# Patient Record
Sex: FEMALE | Race: WHITE | URBAN - METROPOLITAN AREA
[De-identification: names, ages, dates, MRNs, and addresses within clinical notes are randomized per-mention and may not be internally consistent; named-entity substitution may affect disease eponyms.]

---

## 2018-11-16 ENCOUNTER — EMERGENCY (EMERGENCY)
Facility: HOSPITAL | Age: 68
LOS: 0 days | Discharge: HOME | End: 2018-11-16
Attending: EMERGENCY MEDICINE | Admitting: EMERGENCY MEDICINE
Payer: COMMERCIAL

## 2018-11-16 VITALS
SYSTOLIC BLOOD PRESSURE: 197 MMHG | RESPIRATION RATE: 17 BRPM | DIASTOLIC BLOOD PRESSURE: 72 MMHG | TEMPERATURE: 98 F | OXYGEN SATURATION: 99 % | HEART RATE: 80 BPM

## 2018-11-16 VITALS — HEIGHT: 65 IN | WEIGHT: 123.02 LBS
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
## 2018-11-16 DIAGNOSIS — S52.501A UNSPECIFIED FRACTURE OF THE LOWER END OF RIGHT RADIUS, INITIAL ENCOUNTER FOR CLOSED FRACTURE: ICD-10-CM
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
## 2018-11-16 DIAGNOSIS — M79.602 PAIN IN LEFT ARM: ICD-10-CM

## 2018-11-16 PROCEDURE — 73090 X-RAY EXAM OF FOREARM: CPT | Mod: 26,LT

## 2018-11-16 PROCEDURE — 99284 EMERGENCY DEPT VISIT MOD MDM: CPT | Mod: 25

## 2018-11-16 PROCEDURE — 25605 CLTX DST RDL FX/EPHYS SEP W/: CPT | Mod: 54,LT

## 2018-11-16 PROCEDURE — 73110 X-RAY EXAM OF WRIST: CPT | Mod: 26,LT,76

## 2018-11-16 RX ORDER — ACETAMINOPHEN 500 MG
1000 TABLET ORAL ONCE
Qty: 0 | Refills: 0 | Status: COMPLETED | OUTPATIENT
Start: 2018-11-16 | End: 2018-11-16

## 2018-11-16 RX ADMIN — Medication 1000 MILLIGRAM(S): at 07:58

## 2018-11-16 NOTE — ED ADULT NURSE REASSESSMENT NOTE - NS ED NURSE REASSESS COMMENT FT1
Dr Hernandez made aware that pt bp elevated 197/72. MD stated ok to be d/c home with that and will f/u with PMD.

## 2018-11-16 NOTE — PROCEDURE NOTE - NSPERIPVASCEVA_GEN_A_CORE
capillary refill time < 2 seconds/post-application: responses intact/fingers/toes warm to touch/no paresthesia

## 2018-11-16 NOTE — ED PROVIDER NOTE - OBJECTIVE STATEMENT
67 yo pt presents with left arm pain.  Pt here with brother in law.  Pt states yesterday tried to sit in reclining chair.  Missed and fell on left arm.  pt ir right handed. 69 yo pt presents with left arm pain.  Pt here with brother in law.  Pt states yesterday tried to sit in reclining chair.  Missed and fell on left arm.  pt is right handed.  Pt with pain to left wrist and forearm, + bruising.  No travel, no sick contact.  Denies LOC.  No chest pain, no sob.  pt with hx of back issues and on gabapentin for neuropathy.

## 2018-11-16 NOTE — ED ADULT NURSE NOTE - OBJECTIVE STATEMENT
pt was standing on chair, it moved and landed on Left wrist/arm. Pt c/o of pain in left FA, neg loc. Left FA is swollen and ecchymotic, positive radial pulse.

## 2018-11-16 NOTE — ED PROVIDER NOTE - MUSCULOSKELETAL, MLM
ecchymosis distal 1/3 left arm ulnar side.  Mild pain to palp distal forearm/wrist.  Skin intact.  No pain palp at elbow, no pain palp hand.

## 2018-11-16 NOTE — PROCEDURE NOTE - ADDITIONAL PROCEDURE DETAILS
local anesthesia hematoma block under sterile prep with lidocaine in 10cc syringe and 22gu needle follwed by haging arm with IV pole and weighted counter traction, closed reduction performed by Dr. Ayala and application of ST spling well padded, molded, PA assist in procedure

## 2018-11-16 NOTE — ED ADULT NURSE NOTE - NSIMPLEMENTINTERV_GEN_ALL_ED
Implemented All Fall Risk Interventions:  [place] to call system. Call bell, personal items and telephone within reach. Instruct patient to call for assistance. Room bathroom lighting operational. Non-slip footwear when patient is off stretcher. Physically safe environment: no spills, clutter or unnecessary equipment. Stretcher in lowest position, wheels locked, appropriate side rails in place. Provide visual cue, wrist band, yellow gown, etc. Monitor gait and stability. Monitor for mental status changes and reorient to person, place, and time. Review medications for side effects contributing to fall risk. Reinforce activity limits and safety measures with patient and family.

## 2018-11-16 NOTE — ED PROVIDER NOTE - HEME LYMPH, MLM
Physical Exam  Mallampati: II  TM Distance: >3 FB  Neck ROM: Full  Cardio Rhythm: Regular  Cardio Rate: Normal  Breath sounds clear to auscultation:  Yes      Legend: C=Chipped  M=Missing  L=Loose    Anesthesia Plan  ASA Status: 2  Anesthesia Type: General  Induction: Intravenous  Preferred Airway Type: ETT  Reviewed: Problem List, Allergies, Patient Summary, Past Med History, Medications, NPO Status, Beta Blocker Status, Pre-Induction Reassessment, Lab Results, EKG, Consultations and Nursing Notes  The proposed anesthetic plan, including its risks and benefits, have been discussed with the Patient - along with the risks and benefits of alternatives.  Questions were encouraged and answered and the patient and/or representative agrees to proceed.  Blood Products: Not Anticipated  Plan Comments: R/B of general endotracheal anesthesia d/w patient including but not limited to cardiac complications, respiratory complications, CNS complications, N/V, sore throat, dental injury, and transfusion. Questions answered and patient wishes to proceed.      Anesthesia ROS/Med Hx    Overall Review:  Pts. EKG was reviewed     Pulmonary Review:    Pt. negative for sleep apnea   Pt. negative for asthma  Pt. negative for recent URI     Neuro/Psych Review:    Pt. negative for seizures  Pt. negative for CVA  Pt. positive for headaches    Cardiovascular Review:    Exercise tolerance: good  Pt. negative for CHF  Pt. negative for past MI  Pt. negative for CAD  Pt. negative for CABG/stent  Pt. negative for angina  Pt. negative for pacemaker  Pt. negative for LAU  Pt. positive for hypertension  Pt. positive for hyperlipidemia    GI/HEPATIC/RENAL Review:    Pt. negative for liver disease  Pt. negative for renal disease    End/Other Review:    Pt. negative for diabetes  Pt. positive for anemia  Pt. positive for obesity     
No adenopathy or splenomegaly. No cervical or inguinal lymphadenopathy.

## 2018-11-25 ENCOUNTER — INPATIENT (INPATIENT)
Facility: HOSPITAL | Age: 68
LOS: 0 days | Discharge: HOME | End: 2018-11-26
Attending: ORTHOPAEDIC SURGERY | Admitting: ORTHOPAEDIC SURGERY
Payer: COMMERCIAL

## 2018-11-25 ENCOUNTER — TRANSCRIPTION ENCOUNTER (OUTPATIENT)
Age: 68
End: 2018-11-25

## 2018-11-25 VITALS — HEIGHT: 63 IN | WEIGHT: 119.93 LBS

## 2018-11-25 PROBLEM — G57.92 UNSPECIFIED MONONEUROPATHY OF LEFT LOWER LIMB: Chronic | Status: ACTIVE | Noted: 2018-11-16

## 2018-11-25 LAB
ANION GAP SERPL CALC-SCNC: 8 MMOL/L — SIGNIFICANT CHANGE UP (ref 5–17)
APTT BLD: 31.8 SEC — SIGNIFICANT CHANGE UP (ref 27.5–36.3)
BUN SERPL-MCNC: 20 MG/DL — SIGNIFICANT CHANGE UP (ref 7–23)
CALCIUM SERPL-MCNC: 9.3 MG/DL — SIGNIFICANT CHANGE UP (ref 8.5–10.1)
CHLORIDE SERPL-SCNC: 107 MMOL/L — SIGNIFICANT CHANGE UP (ref 96–108)
CO2 SERPL-SCNC: 26 MMOL/L — SIGNIFICANT CHANGE UP (ref 22–31)
CREAT SERPL-MCNC: 0.87 MG/DL — SIGNIFICANT CHANGE UP (ref 0.5–1.3)
GLUCOSE SERPL-MCNC: 102 MG/DL — HIGH (ref 70–99)
HCT VFR BLD CALC: 38.3 % — SIGNIFICANT CHANGE UP (ref 34.5–45)
HGB BLD-MCNC: 12.6 G/DL — SIGNIFICANT CHANGE UP (ref 11.5–15.5)
INR BLD: 0.97 RATIO — SIGNIFICANT CHANGE UP (ref 0.88–1.16)
MCHC RBC-ENTMCNC: 29.7 PG — SIGNIFICANT CHANGE UP (ref 27–34)
MCHC RBC-ENTMCNC: 32.9 GM/DL — SIGNIFICANT CHANGE UP (ref 32–36)
MCV RBC AUTO: 90.3 FL — SIGNIFICANT CHANGE UP (ref 80–100)
NRBC # BLD: 0 /100 WBCS — SIGNIFICANT CHANGE UP (ref 0–0)
PLATELET # BLD AUTO: 351 K/UL — SIGNIFICANT CHANGE UP (ref 150–400)
POTASSIUM SERPL-MCNC: 4.4 MMOL/L — SIGNIFICANT CHANGE UP (ref 3.5–5.3)
POTASSIUM SERPL-SCNC: 4.4 MMOL/L — SIGNIFICANT CHANGE UP (ref 3.5–5.3)
PROTHROM AB SERPL-ACNC: 10.7 SEC — SIGNIFICANT CHANGE UP (ref 10–12.9)
RBC # BLD: 4.24 M/UL — SIGNIFICANT CHANGE UP (ref 3.8–5.2)
RBC # FLD: 12.7 % — SIGNIFICANT CHANGE UP (ref 10.3–14.5)
SODIUM SERPL-SCNC: 141 MMOL/L — SIGNIFICANT CHANGE UP (ref 135–145)
WBC # BLD: 3.86 K/UL — SIGNIFICANT CHANGE UP (ref 3.8–10.5)
WBC # FLD AUTO: 3.86 K/UL — SIGNIFICANT CHANGE UP (ref 3.8–10.5)

## 2018-11-25 PROCEDURE — 99283 EMERGENCY DEPT VISIT LOW MDM: CPT

## 2018-11-25 RX ORDER — OXYCODONE HYDROCHLORIDE 5 MG/1
5 TABLET ORAL ONCE
Qty: 0 | Refills: 0 | Status: DISCONTINUED | OUTPATIENT
Start: 2018-11-25 | End: 2018-11-25

## 2018-11-25 RX ORDER — ACETAMINOPHEN 500 MG
650 TABLET ORAL EVERY 6 HOURS
Qty: 0 | Refills: 0 | Status: DISCONTINUED | OUTPATIENT
Start: 2018-11-25 | End: 2018-11-26

## 2018-11-25 RX ORDER — CEFAZOLIN SODIUM 1 G
2000 VIAL (EA) INJECTION ONCE
Qty: 0 | Refills: 0 | Status: COMPLETED | OUTPATIENT
Start: 2018-11-25 | End: 2018-11-25

## 2018-11-25 RX ORDER — ACETAMINOPHEN 500 MG
1000 TABLET ORAL ONCE
Qty: 0 | Refills: 0 | Status: DISCONTINUED | OUTPATIENT
Start: 2018-11-25 | End: 2018-11-25

## 2018-11-25 RX ORDER — GABAPENTIN 400 MG/1
100 CAPSULE ORAL DAILY
Qty: 0 | Refills: 0 | Status: DISCONTINUED | OUTPATIENT
Start: 2018-11-25 | End: 2018-11-26

## 2018-11-25 RX ORDER — FENTANYL CITRATE 50 UG/ML
50 INJECTION INTRAVENOUS
Qty: 0 | Refills: 0 | Status: DISCONTINUED | OUTPATIENT
Start: 2018-11-25 | End: 2018-11-25

## 2018-11-25 RX ORDER — OXYCODONE HYDROCHLORIDE 5 MG/1
5 TABLET ORAL EVERY 4 HOURS
Qty: 0 | Refills: 0 | Status: DISCONTINUED | OUTPATIENT
Start: 2018-11-25 | End: 2018-11-26

## 2018-11-25 RX ORDER — SODIUM CHLORIDE 9 MG/ML
1000 INJECTION, SOLUTION INTRAVENOUS
Qty: 0 | Refills: 0 | Status: DISCONTINUED | OUTPATIENT
Start: 2018-11-25 | End: 2018-11-25

## 2018-11-25 RX ORDER — ONDANSETRON 8 MG/1
4 TABLET, FILM COATED ORAL ONCE
Qty: 0 | Refills: 0 | Status: DISCONTINUED | OUTPATIENT
Start: 2018-11-25 | End: 2018-11-25

## 2018-11-25 RX ADMIN — GABAPENTIN 100 MILLIGRAM(S): 400 CAPSULE ORAL at 21:37

## 2018-11-25 RX ADMIN — Medication 100 MILLIGRAM(S): at 14:24

## 2018-11-25 RX ADMIN — OXYCODONE HYDROCHLORIDE 5 MILLIGRAM(S): 5 TABLET ORAL at 21:37

## 2018-11-25 RX ADMIN — OXYCODONE HYDROCHLORIDE 5 MILLIGRAM(S): 5 TABLET ORAL at 22:07

## 2018-11-25 NOTE — DISCHARGE NOTE ADULT - CARE PLAN
Principal Discharge DX:	Fracture of radius, distal, left, closed  Goal:	resume adls  Assessment and plan of treatment:	Pain control, patient already has prescription for percocet for severe pain  Keep splint clean dry and intact until seen in office  Keep LUE elevated for swelling  Block should work for 24 hours, expect pain to get worse after that  Non weight bearing LUE in splint  Follow up in 1 week from discharge

## 2018-11-25 NOTE — ED ADULT NURSE NOTE - NSIMPLEMENTINTERV_GEN_ALL_ED
Implemented All Fall Risk Interventions:  [place] to call system. Call bell, personal items and telephone within reach. Instruct patient to call for assistance. Room bathroom lighting operational. Non-slip footwear when patient is off stretcher. Physically safe environment: no spills, clutter or unnecessary equipment. Stretcher in lowest position, wheels locked, appropriate side rails in place. Provide visual cue, wrist band, yellow gown, etc. Monitor gait and stability. Monitor for mental status changes and reorient to person, place, and time. Review medications for side effects contributing to fall risk. Reinforce activity limits and safety measures with patient and family.

## 2018-11-25 NOTE — H&P ADULT - ASSESSMENT
A/P: 68F w/ L Distal Radius Fx  Plan for OR today for ORIF  NPO  IVF while NPO  Hold all chemical DVT ppx  NWB [facility]  Discussed with attending Dr. Ayala who is in agreement with above plan

## 2018-11-25 NOTE — DISCHARGE NOTE ADULT - PLAN OF CARE
resume adls Pain control, patient already has prescription for percocet for severe pain  Keep splint clean dry and intact until seen in office  Keep LUE elevated for swelling  Block should work for 24 hours, expect pain to get worse after that  Non weight bearing LUE in splint  Follow up in 1 week from discharge

## 2018-11-25 NOTE — ED ADULT NURSE NOTE - OBJECTIVE STATEMENT
Pt has known fx from slip and fall. Pt told to come to ED for possible procedure. Pt verbalizes awareness of POC.  Fingers warm and mobile.

## 2018-11-25 NOTE — BRIEF OPERATIVE NOTE - PROCEDURE
<<-----Click on this checkbox to enter Procedure Open reduction and internal fixation (ORIF) of fracture of distal radius  11/25/2018    Active  NFRANE

## 2018-11-25 NOTE — PROGRESS NOTE ADULT - SUBJECTIVE AND OBJECTIVE BOX
Left Infraclavicular Nerve Block Note:  Time out performed, pt awake and alert, sterile prep with chlorhexidine and drape, ultrasound-guided, 20G 6" stimuplex needle, good visualization of needle and nerve at all times, 30cc of 0.375% Ropivacaine injected easily, no heme after aspirating every 5cc, no intraneural injection, no paresthesia.  Procedure well tolerated without complications.  Block performed as per surgeon's request.

## 2018-11-25 NOTE — H&P ADULT - HISTORY OF PRESENT ILLNESS
Patient is a 68F RHD who presents to the ED today for a c/o of L wrist pain. Patient sustained a fracture to her L distal radius last week and now presents to [facility] for operative fixation. Denies any numbness/tingling. Denies having any other pain elsewhere. No other orthopedic concerns at this time.

## 2018-11-25 NOTE — H&P ADULT - NSHPPHYSICALEXAM_GEN_ALL_CORE
Vital Signs Last 24 Hrs  T(C): 36.6 (25 Nov 2018 06:54), Max: 36.6 (25 Nov 2018 06:54)  T(F): 97.9 (25 Nov 2018 06:54), Max: 97.9 (25 Nov 2018 06:54)  HR: 77 (25 Nov 2018 06:54) (77 - 77)  BP: 140/87 (25 Nov 2018 06:54) (140/87 - 140/87)  RR: 16 (25 Nov 2018 06:54) (16 - 16)  SpO2: 100% (25 Nov 2018 06:54) (100% - 100%)    Gen: NAD; Resting comfortably  LUE:  Sugar tong splint in place  +AIN/PIN/Median/Radial/Ulnar  SILT C5-T1  Capillary refill <2 secs  Compartments soft and compressible    Secondary Survey: No TTP over bony prominences, SILT, palpable pulses, full/painless range of motion, compartments soft

## 2018-11-25 NOTE — DISCHARGE NOTE ADULT - HOSPITAL COURSE
patient was admitted to [facility] 11/15/2018 for operative fixation of distal radius left fracture. Patient was seen in office and xrays revealed displacement of closed reduction. Patient was medically optimized for procedure and there were no complications. Patient transfered from PACU in stable condition and stable for discharge same day of surgery.

## 2018-11-25 NOTE — DISCHARGE NOTE ADULT - PATIENT PORTAL LINK FT
You can access the [facility] Patient Portal, offered by [facility], by registering with the following website: [email or web address]

## 2018-11-25 NOTE — DISCHARGE NOTE ADULT - CARE PROVIDER_API CALL
Ashok Ayala), Orthopaedic Surgery; Surgery of the Hand  [address] [place] [address]  Phone: [phone]  Fax: [phone]

## 2018-11-25 NOTE — ED PROVIDER NOTE - OBJECTIVE STATEMENT
pt with left distal radius fx last week sent to ed by ortho for admission for surgery no complaints pain controlled

## 2018-11-25 NOTE — DISCHARGE NOTE ADULT - MEDICATION SUMMARY - MEDICATIONS TO TAKE
I will START or STAY ON the medications listed below when I get home from the hospital:    Percocet 5/325 oral tablet  -- 1-2 tab(s) by mouth every 6 hours, As Needed for pain MDD:8  -- Caution federal law prohibits the transfer of this drug to any person other  than the person for whom it was prescribed.  May cause drowsiness.  Alcohol may intensify this effect.  Use care when operating dangerous machinery.  This prescription cannot be refilled.  This product contains acetaminophen.  Do not use  with any other product containing acetaminophen to prevent possible liver damage.  Using more of this medication than prescribed may cause serious breathing problems.    -- Indication: For prn severe pain, tylenol for mild pain I will START or STAY ON the medications listed below when I get home from the hospital:    Percocet 5/325 oral tablet  -- 1-2 tab(s) by mouth every 6 hours, As Needed for pain MDD:6  -- Caution federal law prohibits the transfer of this drug to any person other  than the person for whom it was prescribed.  May cause drowsiness.  Alcohol may intensify this effect.  Use care when operating dangerous machinery.  This prescription cannot be refilled.  This product contains acetaminophen.  Do not use  with any other product containing acetaminophen to prevent possible liver damage.  Using more of this medication than prescribed may cause serious breathing problems.    -- Indication: For For severe pain

## 2018-11-26 VITALS
TEMPERATURE: 99 F | OXYGEN SATURATION: 99 % | HEART RATE: 67 BPM | SYSTOLIC BLOOD PRESSURE: 123 MMHG | DIASTOLIC BLOOD PRESSURE: 54 MMHG | RESPIRATION RATE: 16 BRPM

## 2018-11-26 RX ORDER — ACETAMINOPHEN 500 MG
1000 TABLET ORAL ONCE
Qty: 0 | Refills: 0 | Status: COMPLETED | OUTPATIENT
Start: 2018-11-26 | End: 2018-11-26

## 2018-11-26 RX ADMIN — Medication 400 MILLIGRAM(S): at 03:46

## 2018-11-26 RX ADMIN — OXYCODONE HYDROCHLORIDE 5 MILLIGRAM(S): 5 TABLET ORAL at 01:50

## 2018-11-26 RX ADMIN — Medication 1000 MILLIGRAM(S): at 04:01

## 2018-11-26 RX ADMIN — OXYCODONE HYDROCHLORIDE 5 MILLIGRAM(S): 5 TABLET ORAL at 01:20

## 2018-11-26 RX ADMIN — OXYCODONE HYDROCHLORIDE 5 MILLIGRAM(S): 5 TABLET ORAL at 05:50

## 2018-11-26 RX ADMIN — OXYCODONE HYDROCHLORIDE 5 MILLIGRAM(S): 5 TABLET ORAL at 05:20

## 2018-11-26 NOTE — PROGRESS NOTE ADULT - ASSESSMENT
patient to be discharged this am  See discharge note for discharge recommendations  orthopedic stable for discharge

## 2018-11-26 NOTE — PROGRESS NOTE ADULT - SUBJECTIVE AND OBJECTIVE BOX
Pt S/E at bedside, no acute events overnight, pain controlled    AVSS  Gen: NAD, AAOx3    LUE:  Dressing clean dry intact  +AIN/PIN/M/R/U/Msc/Ax  SILT C5-T1  +Radial Pulse  Compartments soft  No calf TTP B/L

## 2018-11-28 DIAGNOSIS — Z88.8 ALLERGY STATUS TO OTHER DRUGS, MEDICAMENTS AND BIOLOGICAL SUBSTANCES: ICD-10-CM

## 2018-11-28 DIAGNOSIS — S52.572A OTHER INTRAARTICULAR FRACTURE OF LOWER END OF LEFT RADIUS, INITIAL ENCOUNTER FOR CLOSED FRACTURE: ICD-10-CM
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
## 2018-11-28 DIAGNOSIS — G62.9 POLYNEUROPATHY, UNSPECIFIED: ICD-10-CM
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
## 2019-04-19 ENCOUNTER — INPATIENT (INPATIENT)
Facility: HOSPITAL | Age: 69
LOS: 0 days | Discharge: HOME | End: 2019-04-20
Attending: INTERNAL MEDICINE | Admitting: INTERNAL MEDICINE
Payer: COMMERCIAL

## 2019-04-19 VITALS
TEMPERATURE: 98 F | OXYGEN SATURATION: 100 % | HEART RATE: 90 BPM | SYSTOLIC BLOOD PRESSURE: 155 MMHG | DIASTOLIC BLOOD PRESSURE: 89 MMHG | RESPIRATION RATE: 18 BRPM

## 2019-04-19 DIAGNOSIS — Z98.890 OTHER SPECIFIED POSTPROCEDURAL STATES: Chronic | ICD-10-CM

## 2019-04-19 DIAGNOSIS — G45.4 TRANSIENT GLOBAL AMNESIA: ICD-10-CM

## 2019-04-19 DIAGNOSIS — M48.00 SPINAL STENOSIS, SITE UNSPECIFIED: ICD-10-CM

## 2019-04-19 DIAGNOSIS — G62.9 POLYNEUROPATHY, UNSPECIFIED: ICD-10-CM

## 2019-04-19 DIAGNOSIS — K21.9 GASTRO-ESOPHAGEAL REFLUX DISEASE WITHOUT ESOPHAGITIS: ICD-10-CM

## 2019-04-19 DIAGNOSIS — Z87.81 PERSONAL HISTORY OF (HEALED) TRAUMATIC FRACTURE: Chronic | ICD-10-CM

## 2019-04-19 DIAGNOSIS — G45.9 TRANSIENT CEREBRAL ISCHEMIC ATTACK, UNSPECIFIED: ICD-10-CM

## 2019-04-19 LAB
ALBUMIN SERPL ELPH-MCNC: 4.4 G/DL — SIGNIFICANT CHANGE UP (ref 3.3–5)
ALP SERPL-CCNC: 88 U/L — SIGNIFICANT CHANGE UP (ref 40–120)
ALT FLD-CCNC: 36 U/L — SIGNIFICANT CHANGE UP (ref 12–78)
ANION GAP SERPL CALC-SCNC: 12 MMOL/L — SIGNIFICANT CHANGE UP (ref 5–17)
APPEARANCE UR: CLEAR — SIGNIFICANT CHANGE UP
APTT BLD: 30.3 SEC — SIGNIFICANT CHANGE UP (ref 27.5–36.3)
AST SERPL-CCNC: 25 U/L — SIGNIFICANT CHANGE UP (ref 15–37)
BACTERIA # UR AUTO: NEGATIVE — SIGNIFICANT CHANGE UP
BASOPHILS # BLD AUTO: 0.05 K/UL — SIGNIFICANT CHANGE UP (ref 0–0.2)
BASOPHILS NFR BLD AUTO: 0.8 % — SIGNIFICANT CHANGE UP (ref 0–2)
BILIRUB SERPL-MCNC: 0.5 MG/DL — SIGNIFICANT CHANGE UP (ref 0.2–1.2)
BILIRUB UR-MCNC: NEGATIVE — SIGNIFICANT CHANGE UP
BUN SERPL-MCNC: 23 MG/DL — SIGNIFICANT CHANGE UP (ref 7–23)
CALCIUM SERPL-MCNC: 9.9 MG/DL — SIGNIFICANT CHANGE UP (ref 8.5–10.1)
CHLORIDE SERPL-SCNC: 104 MMOL/L — SIGNIFICANT CHANGE UP (ref 96–108)
CO2 SERPL-SCNC: 25 MMOL/L — SIGNIFICANT CHANGE UP (ref 22–31)
COLOR SPEC: YELLOW — SIGNIFICANT CHANGE UP
CREAT SERPL-MCNC: 0.93 MG/DL — SIGNIFICANT CHANGE UP (ref 0.5–1.3)
DIFF PNL FLD: NEGATIVE — SIGNIFICANT CHANGE UP
EOSINOPHIL # BLD AUTO: 0.05 K/UL — SIGNIFICANT CHANGE UP (ref 0–0.5)
EOSINOPHIL NFR BLD AUTO: 0.8 % — SIGNIFICANT CHANGE UP (ref 0–6)
EPI CELLS # UR: NEGATIVE — SIGNIFICANT CHANGE UP
GLUCOSE SERPL-MCNC: 113 MG/DL — HIGH (ref 70–99)
GLUCOSE UR QL: NEGATIVE MG/DL — SIGNIFICANT CHANGE UP
HCT VFR BLD CALC: 39 % — SIGNIFICANT CHANGE UP (ref 34.5–45)
HGB BLD-MCNC: 13.4 G/DL — SIGNIFICANT CHANGE UP (ref 11.5–15.5)
IMM GRANULOCYTES NFR BLD AUTO: 0.2 % — SIGNIFICANT CHANGE UP (ref 0–1.5)
INR BLD: 1.03 RATIO — SIGNIFICANT CHANGE UP (ref 0.88–1.16)
KETONES UR-MCNC: ABNORMAL
LEUKOCYTE ESTERASE UR-ACNC: ABNORMAL
LYMPHOCYTES # BLD AUTO: 1.49 K/UL — SIGNIFICANT CHANGE UP (ref 1–3.3)
LYMPHOCYTES # BLD AUTO: 22.9 % — SIGNIFICANT CHANGE UP (ref 13–44)
MCHC RBC-ENTMCNC: 30.9 PG — SIGNIFICANT CHANGE UP (ref 27–34)
MCHC RBC-ENTMCNC: 34.4 GM/DL — SIGNIFICANT CHANGE UP (ref 32–36)
MCV RBC AUTO: 90.1 FL — SIGNIFICANT CHANGE UP (ref 80–100)
MONOCYTES # BLD AUTO: 0.57 K/UL — SIGNIFICANT CHANGE UP (ref 0–0.9)
MONOCYTES NFR BLD AUTO: 8.8 % — SIGNIFICANT CHANGE UP (ref 2–14)
NEUTROPHILS # BLD AUTO: 4.34 K/UL — SIGNIFICANT CHANGE UP (ref 1.8–7.4)
NEUTROPHILS NFR BLD AUTO: 66.5 % — SIGNIFICANT CHANGE UP (ref 43–77)
NITRITE UR-MCNC: NEGATIVE — SIGNIFICANT CHANGE UP
NRBC # BLD: 0 /100 WBCS — SIGNIFICANT CHANGE UP (ref 0–0)
PH UR: 7 — SIGNIFICANT CHANGE UP (ref 5–8)
PLATELET # BLD AUTO: 279 K/UL — SIGNIFICANT CHANGE UP (ref 150–400)
POTASSIUM SERPL-MCNC: 3.8 MMOL/L — SIGNIFICANT CHANGE UP (ref 3.5–5.3)
POTASSIUM SERPL-SCNC: 3.8 MMOL/L — SIGNIFICANT CHANGE UP (ref 3.5–5.3)
PROT SERPL-MCNC: 8.4 GM/DL — HIGH (ref 6–8.3)
PROT UR-MCNC: NEGATIVE MG/DL — SIGNIFICANT CHANGE UP
PROTHROM AB SERPL-ACNC: 11.5 SEC — SIGNIFICANT CHANGE UP (ref 10–12.9)
RBC # BLD: 4.33 M/UL — SIGNIFICANT CHANGE UP (ref 3.8–5.2)
RBC # FLD: 12.8 % — SIGNIFICANT CHANGE UP (ref 10.3–14.5)
RBC CASTS # UR COMP ASSIST: NEGATIVE /HPF — SIGNIFICANT CHANGE UP (ref 0–4)
SODIUM SERPL-SCNC: 141 MMOL/L — SIGNIFICANT CHANGE UP (ref 135–145)
SP GR SPEC: 1 — LOW (ref 1.01–1.02)
UROBILINOGEN FLD QL: NEGATIVE MG/DL — SIGNIFICANT CHANGE UP
WBC # BLD: 6.51 K/UL — SIGNIFICANT CHANGE UP (ref 3.8–10.5)
WBC # FLD AUTO: 6.51 K/UL — SIGNIFICANT CHANGE UP (ref 3.8–10.5)
WBC UR QL: SIGNIFICANT CHANGE UP

## 2019-04-19 PROCEDURE — 93010 ELECTROCARDIOGRAM REPORT: CPT

## 2019-04-19 PROCEDURE — 70496 CT ANGIOGRAPHY HEAD: CPT | Mod: 26

## 2019-04-19 PROCEDURE — 70498 CT ANGIOGRAPHY NECK: CPT | Mod: 26

## 2019-04-19 PROCEDURE — 99285 EMERGENCY DEPT VISIT HI MDM: CPT

## 2019-04-19 PROCEDURE — 70551 MRI BRAIN STEM W/O DYE: CPT | Mod: 26

## 2019-04-19 PROCEDURE — 71045 X-RAY EXAM CHEST 1 VIEW: CPT | Mod: 26

## 2019-04-19 RX ORDER — ASPIRIN/CALCIUM CARB/MAGNESIUM 324 MG
325 TABLET ORAL ONCE
Qty: 0 | Refills: 0 | Status: COMPLETED | OUTPATIENT
Start: 2019-04-19 | End: 2019-04-19

## 2019-04-19 RX ORDER — ASPIRIN/CALCIUM CARB/MAGNESIUM 324 MG
81 TABLET ORAL DAILY
Qty: 0 | Refills: 0 | Status: DISCONTINUED | OUTPATIENT
Start: 2019-04-19 | End: 2019-04-20

## 2019-04-19 RX ORDER — ATORVASTATIN CALCIUM 80 MG/1
10 TABLET, FILM COATED ORAL AT BEDTIME
Qty: 0 | Refills: 0 | Status: DISCONTINUED | OUTPATIENT
Start: 2019-04-19 | End: 2019-04-20

## 2019-04-19 RX ORDER — ENOXAPARIN SODIUM 100 MG/ML
40 INJECTION SUBCUTANEOUS EVERY 24 HOURS
Qty: 0 | Refills: 0 | Status: DISCONTINUED | OUTPATIENT
Start: 2019-04-19 | End: 2019-04-20

## 2019-04-19 RX ORDER — PANTOPRAZOLE SODIUM 20 MG/1
40 TABLET, DELAYED RELEASE ORAL
Qty: 0 | Refills: 0 | Status: DISCONTINUED | OUTPATIENT
Start: 2019-04-19 | End: 2019-04-20

## 2019-04-19 RX ADMIN — Medication 325 MILLIGRAM(S): at 15:09

## 2019-04-19 RX ADMIN — ENOXAPARIN SODIUM 40 MILLIGRAM(S): 100 INJECTION SUBCUTANEOUS at 21:47

## 2019-04-19 NOTE — ED ADULT TRIAGE NOTE - CHIEF COMPLAINT QUOTE
Pt presents to ED with onset of symptoms starting about 9:30 am, pt believes it is year 2010, asking repetitive questions.  No facial droop or slurred speech.  Unable to obtain much information, pt upset in triage.  Dr. Leach called for dong bunch and CODE STROKE called based on presenting symptoms.

## 2019-04-19 NOTE — H&P ADULT - ASSESSMENT
69 y/o female with PMHx of spinal stenosis with neuropathy and GERD who presented to  with period of confusion/ amnesia.  History obtained from chart, patient, and son at bedside.  Pt was in her usual state of health up until about 11am this morning.  She was fine yesterday and went to work in [employer].  This morning, her daughter called her saying she was fired from her job.  After this, she was very upset and started talking to other family members.  She then spoke to her daughter again and did not recall all the events of their previous conversation and was not making sense when she was speaking as per the son/ daughter.  She presented to the ER for concern of CVA.  In ER, she was a code stroke.  CT was negative-- no acute CVA.  Pt was seen by Neurology and thought to have Transient Global Amnesia.  MRI was ordered.      1.  Confusion/ Amnesia:    Concern for transient global amnesia; however, sx still ongoing for 6+ hours.    CT head on admission negative.    Check MRI brain.    T/c EEG if sx persist.    Neuro eval appreciated.    Check CXR/ UA to r/o other causes-- infection.    Check TSH/ B12 to r/o metabolic causes.    Unclear if patient was taking neurontin regularly at home.    Neuro checks.    ECHO.    ASA/ statin until MRI rulled out small CVA.    TELE admit.      2.  GERD:    cont protonix.      3.  Spinal stenosis/ Neuropathy:    Hold neurontin with confusion.      4.  DVT Proph:  Lovenox.      Advanced Directives:  Full Code.      DVT proph -  IMPROVE VTE Individual Risk Assessment    RISK                                                                Points    [  ] Previous VTE                                                  3    [  ] Thrombophilia                                               2    [  ] Lower limb paralysis                                      2        (unable to hold up >15 seconds)      [  ] Current Cancer                                              2         (within 6 months)    [  ] Immobilization > 24 hrs                                1    [  ] ICU/CCU stay > 24 hours                              1    [  ] Age > 60                                                      1    IMPROVE VTE Score ______1___      Total time spent 75 min.

## 2019-04-19 NOTE — ED ADULT NURSE NOTE - OBJECTIVE STATEMENT
pt brought to ED for evaluation of change in mental status. Pt called friend this morning, estimated between 9 and 10am as per friend Eva. She sts pt was speaking normally saying her daughter was fired. Pt than called again between 10 and 11 am and was acting "differently" she was asking repeating questions, and was "not acting right". Upon arrival to ED and throughout CT, pt appeared anxious and kept asking what day it was. Pt had difficulty recalling year and her birthday. No facial droop, speech clear, equal strong strength x4 extremities.

## 2019-04-19 NOTE — ED PROVIDER NOTE - OBJECTIVE STATEMENT
67 y/o F with last known normal 0930 this morning presents with stuttering, vasiliy ting herself, shakiness. AxOx2, believes it is 2010. Pt continues to ask same questions over and over again. NIH: 1. 67 y/o F with PMHx of presenting to the ED BIBEMS for stroke evaluation. Per pt's son, pt was on the phone having a conversation around noon today after receiving stressful news when she blacked out and could not remember anything. Pt then called her neighbor and tried to explain that she could not remember anything when neighbor became worried and called 911. NIH: 0. 69 y/o F with PMHx of presenting to the ED BIBEMS for stroke evaluation. Per pt's son, pt was on the phone having a conversation around noon today after receiving stressful news when she blacked out and could not remember anything. Pt then called her neighbor and tried to explain that she could not remember anything when neighbor became worried and called 911. Upon evaluation, pt still cannot remember what happened and is unsure as to why she is in ED. NIH: 0.

## 2019-04-19 NOTE — H&P ADULT - NSHPLABSRESULTS_GEN_ALL_CORE
04-19    141  |  104  |  23  ----------------------------<  113<H>  3.8   |  25  |  0.93    Ca    9.9      19 Apr 2019 14:02    TPro  8.4<H>  /  Alb  4.4  /  TBili  0.5  /  DBili  x   /  AST  25  /  ALT  36  /  AlkPhos  88  04-19                            13.4   6.51  )-----------( 279      ( 19 Apr 2019 14:02 )             39.0             LIVER FUNCTIONS - ( 19 Apr 2019 14:02 )  Alb: 4.4 g/dL / Pro: 8.4 gm/dL / ALK PHOS: 88 U/L / ALT: 36 U/L / AST: 25 U/L / GGT: x             PT/INR - ( 19 Apr 2019 14:02 )   PT: 11.5 sec;   INR: 1.03 ratio         PTT - ( 19 Apr 2019 14:02 )  PTT:30.3 sec    < from: CT Angio Head w/ IV Cont (04.19.19 @ 13:57) >    IMPRESSION:          1.   Right carotid system:  No hemodynamically significant stenosis.          2.   Left carotid system:  No hemodynamically significant stenosis.           3.   Intracranial circulation:  No hemodynamically significant   stenosis.        4.   Brain:  No acute infarct or hemorrhage.      Critical value:  I discussed the finding of this report with Dr. Torres   at 2:00 PM on April 19, 2019.  Critical value policy of the hospital was   followed.  Read back and confirmation of receipt of this communication   was performed.  This verbal communication supplements the text report of   this document.    < end of copied text >

## 2019-04-19 NOTE — ED ADULT NURSE NOTE - CHPI ED NUR SYMPTOMS NEG
no numbness/no vomiting/no fever/no nausea/no blurred vision/no loss of consciousness/no weakness/no change in level of consciousness/no dizziness

## 2019-04-19 NOTE — H&P ADULT - NSHPPHYSICALEXAM_GEN_ALL_CORE
HEENT:   pupils equal and reactive, EOMI, no oropharyngeal lesions, erythema, exudates, oral thrush  NECK:   supple, no carotid bruits, no palpable lymph nodes, no thyromegaly  CV:  +S1, +S2, regular, no murmurs or rubs  RESP:   lungs clear to auscultation bilaterall, no wheezing, rales, rhonchi, good air entry bilaterally  BREAST:  not examined  GI:  abdomen soft, non-tender, non-distended, normal BS, no bruits, no abdominal masses, no palpable masses  RECTAL:  not examined  :  not examined  MSK:   normal muscle tone, no atrophy, no rigidity, no contractions  EXT:   no clubbing, no cyanosis, no edema, no calf pain, swelling or erythema  VASCULAR:  pulses equal and symmetric in the upper and lower extremities  NEURO:  alert and awake.  forgetful.  +amnesia of today's events.  no pronator drift.  no weakness.  no facial droop.    SKIN:  no ulcers, lesions or rashes

## 2019-04-19 NOTE — H&P ADULT - HISTORY OF PRESENT ILLNESS
69 y/o female with PMHx of spinal stenosis with neuropathy and GERD who presented to  with period of confusion/ amnesia.  History obtained from chart, patient, and son at bedside.  Pt was in her usual state of health up until about 11am this morning.  She was fine yesterday and went to work in [employer].  This morning, her daughter called her saying she was fired from her job.  After this, she was very upset and started talking to other family members.  She then spoke to her daughter again and did not recall all the events of their previous conversation and was not making sense when she was speaking as per the son/ daughter.  She presented to the ER for concern of CVA.  In ER, she was a code stroke.  CT was negative-- no acute CVA.  Pt was seen by Neurology and thought to have Transient Global Amnesia.  MRI was ordered.      Upon my questioning ~5pm, patient still confused.  Asking similar questions over and over.  Forgetting things that were just said a few minutes ago.  Does not remember earlier events of the morning.  Does not remember going to work yesterday.  No hx of similar sx in the past.  No weakness/ numbness/ slurred speech.

## 2019-04-19 NOTE — ED PROVIDER NOTE - PROGRESS NOTE DETAILS
spoke with neurologist pt likely has transient global amnesia will admit for stroke workup MARIELLA Rowell DO

## 2019-04-19 NOTE — CONSULT NOTE ADULT - SUBJECTIVE AND OBJECTIVE BOX
Patient is a 68y old  Female who presents with a chief complaint of amnesia     HPI:  68 woman with mitral valve replacement, no other significant medical history, presenting with amnesia.  She spoke to her daughter early this morning and found out her daugther was fired from her job. She then spoke to her son around 11:30 and was normal. Then called her daughter again at 11:45 and was noted to be repeating phrases every 1-2 minutes. She had no dysarthria, aphasia, or nonsensical speech. She denies headache, weakness, numbness, tingling, vision change. She has never had stroke or seizure. She denies vascular risk factors, not a smoker.  This has not happened before.  CT head and CTA head/neck unremarkable.  NIHSS 0.     PAST MEDICAL & SURGICAL HISTORY:  Neuropathic pain, leg, left  Mitral valve repair    FAMILY HISTORY:  No pertinent family history in first degree relatives    Social Hx: Nonsmoker, no drug or alcohol use    MEDICATIONS  (STANDING):  Patient does not have med list - not taking any blood thinners     Allergies  NIFEdipine (Unknown)    ROS: Pertinent positives in HPI, all other ROS were reviewed and are negative.      Vital Signs Last 24 Hrs  T(C): --  T(F): --  HR: --  BP: --  BP(mean): --  RR: --  SpO2: --    Constitutional: awake and alert.  HEENT: PERRLA, EOMI,   Neck: Supple.  Respiratory: Breath sounds are clear bilaterally  Cardiovascular: S1 and S2, regular  Gastrointestinal: soft, nontender  Extremities:  no edema  Vascular: Caritid Bruit - no  Musculoskeletal: no joint swelling/tenderness, no abnormal movements  Skin: No rashes    Neurological exam:  HF: [facility], April 2019, age (but takes her some time). 3/3 immediate 0/3 delayed recall does not remember me asking her to remember the three words. Repeats "why am I here" "did daughter get fired" Intact naming and repetiton. Speech fluent, No Aphasia or paraphasic errors.  CN: BIJU, EOMI, VFF, facial sensation normal, no NLFD, tongue midline, Palate moves equally, SCM equal bilaterally  Motor: No pronator drift, Strength 5/5 in all 4 ext, normal bulk and tone, no tremor, rigidity or bradykinesia.    Sens: Intact to light touch, no extinction to DSS    Reflexes: Symmetric and normal . BJ 2+, BR 2+, KJ 2+, AJ 2+  Coord:  No FNFA, dysmetria, HKS normal  Gait/Balance: Cannot test    NIHSS: 0    Labs:     Radiology:  - CT Head: official read pending no HD significant stenosis, hemorrahge, or loss grey white on my read    A/P:  68 F with no vascular risk factors, mitral valve replacement presenting with sudden onset short term memory loss in setting of emotional stressor most consistent with transient global amnesia (TGA). Low concern for stroke or seizure given no history or risk factors.  No TPA, NIHSS 0. Discussed case with family, admit for observation.     - MRI brain (thin cuts through hippocampus) for TGA  - If not back to baseline can consider EEG.   - Lipid/A1c for risk factor management  - Admit for observation

## 2019-04-19 NOTE — ED STATDOCS - PROGRESS NOTE DETAILS
Sharon LUIS for ED attending, Dr. Torres: 69 y/o F with last known normal 0930 this morning presents with stuttering, vasiliy ting herself, shakiness. AxOx2, believes it is 2010. Pt continues to ask same questions over and over again. NIH: 1. Given this is <24 hours, will call code stroke and send to main for further evaluation.

## 2019-04-19 NOTE — H&P ADULT - NSHPSOCIALHISTORY_GEN_ALL_CORE
Lives at home.    Social drinking on weekends.  Few glasses of wine.  Not daily.    Ex-smoker-- quit at age 30.  Smoked 1 PPD for 15 years.    No drug use.    Works in [place].

## 2019-04-19 NOTE — H&P ADULT - NSHPREVIEWOFSYSTEMS_GEN_ALL_CORE
REVIEW OF SYSTEMS:    CONSTITUTIONAL: No weakness, fevers or chills  EYES/ENT: No visual changes;  No vertigo or throat pain   NECK: No pain or stiffness  RESPIRATORY: No cough, wheezing, hemoptysis; No shortness of breath  CARDIOVASCULAR: No chest pain or palpitations  GASTROINTESTINAL: No abdominal or epigastric pain. No nausea, vomiting, or hematemesis; No diarrhea or constipation. No melena or hematochezia.  GENITOURINARY: No dysuria, frequency or hematuria  NEUROLOGICAL: No numbness or weakness.  +Amnesia.    SKIN: No itching, burning, rashes, or lesions   All other review of systems is negative unless indicated above.

## 2019-04-20 ENCOUNTER — TRANSCRIPTION ENCOUNTER (OUTPATIENT)
Age: 69
End: 2019-04-20

## 2019-04-20 VITALS
RESPIRATION RATE: 18 BRPM | DIASTOLIC BLOOD PRESSURE: 45 MMHG | SYSTOLIC BLOOD PRESSURE: 134 MMHG | TEMPERATURE: 98 F | HEART RATE: 50 BPM | OXYGEN SATURATION: 99 %

## 2019-04-20 DIAGNOSIS — G45.4 TRANSIENT GLOBAL AMNESIA: ICD-10-CM

## 2019-04-20 LAB
CHOLEST SERPL-MCNC: 234 MG/DL — HIGH (ref 10–199)
HBA1C BLD-MCNC: 5.6 % — SIGNIFICANT CHANGE UP (ref 4–5.6)
HCV AB S/CO SERPL IA: 0.09 S/CO — SIGNIFICANT CHANGE UP (ref 0–0.99)
HCV AB SERPL-IMP: SIGNIFICANT CHANGE UP
HDLC SERPL-MCNC: 69 MG/DL — SIGNIFICANT CHANGE UP
LIPID PNL WITH DIRECT LDL SERPL: 143 MG/DL — HIGH
TOTAL CHOLESTEROL/HDL RATIO MEASUREMENT: 3.4 RATIO — SIGNIFICANT CHANGE UP (ref 3.3–7.1)
TRIGL SERPL-MCNC: 111 MG/DL — SIGNIFICANT CHANGE UP (ref 10–149)
TSH SERPL-MCNC: 4.13 UU/ML — SIGNIFICANT CHANGE UP (ref 0.34–4.82)
VIT B12 SERPL-MCNC: 932 PG/ML — SIGNIFICANT CHANGE UP (ref 232–1245)

## 2019-04-20 PROCEDURE — 99232 SBSQ HOSP IP/OBS MODERATE 35: CPT

## 2019-04-20 RX ORDER — ATORVASTATIN CALCIUM 80 MG/1
1 TABLET, FILM COATED ORAL
Qty: 30 | Refills: 0
Start: 2019-04-20 | End: 2019-05-19

## 2019-04-20 RX ORDER — ASPIRIN/CALCIUM CARB/MAGNESIUM 324 MG
1 TABLET ORAL
Qty: 0 | Refills: 0 | DISCHARGE
Start: 2019-04-20

## 2019-04-20 RX ADMIN — Medication 81 MILLIGRAM(S): at 11:50

## 2019-04-20 RX ADMIN — PANTOPRAZOLE SODIUM 40 MILLIGRAM(S): 20 TABLET, DELAYED RELEASE ORAL at 06:34

## 2019-04-20 NOTE — CONSULT NOTE ADULT - SUBJECTIVE AND OBJECTIVE BOX
Patient is a 68y old  Female who presents with a chief complaint of confusion       HPI:  Patient is 68-year-old with history of spinal stenosis with neuropathy and GERD, patient was admitted with complains of global amnesia and confusion. Patient states she received a bad news at home, her daughter lost her job and she became anxious and after that she felt overwhelmed and became confused. On arrival in the emergency room she was confused, but her confusion resolved after admission. Since admission she has been alert and oriented. She denies any headache or blurred vision. Her CT of the brain and carotids and MRI of the brain have been reported to be normal. Patient now is comfortable and is in no acute distress. She denies any exertional chest pain, dyspnea only with moderate to severe exertion for several months but no orthopnea, PND or any ankle edema. She is otherwise active and is asymptomatic on routine activity. She states she leads a sedentary lifestyle.     MR Head No Cont (19     FINDINGS: The brain parenchyma is normal in signal and morphology. There   is no evidence of acute ischemia on the diffusion-weighted images.    Special attention to the medial temporal lobes demonstrates no evidence   of mesial temporal sclerosis, cortical dysplasia, or gray matter   heterotopia.    Ventricular size and configuration is unremarkable. No abnormal extra   axial fluid collections are noted. Flow-voids are noted throughout the   major intracranial vessels, onthe T2 weighted images, consistent with   their patency. There is an incidental 7 mm pineal region cyst.    The paranasal sinuses and mastoid air cells are clear. Calvarial signal   is within normal limits. The orbits appear unremarkable.    IMPRESSION: No acute intracranial hemorrhage, mass effect, or shift of   the midline structures.    Incidental 7 mm pineal region cyst.       CT Angio Neck w/ IV Cont (19     IMPRESSION:          1.   Right carotid system:  No hemodynamically significant stenosis.          2.   Left carotid system:  No hemodynamically significant stenosis.           3.   Intracranial circulation:  No hemodynamically significant   stenosis.        4.   Brain:  No acute infarct or hemorrhage.      < from: Xray Chest 1 View- PORTABLE-Routine (19   Findings:    The heart is normal in size.  The lungs are grossly clear. The apices and   hemidiaphragms are unremarkable. Degenerative changes of the visualized   osseous structures.        Impression:    No acute disease        PAST MEDICAL & SURGICAL HISTORY:  Chronic GERD  Spinal stenosis  Neuropathic pain, leg, left  H/O varicose vein stripping  H/O fracture of wrist          MEDICATIONS  (STANDING):  aspirin enteric coated 81 milliGRAM(s) Oral daily  atorvastatin 10 milliGRAM(s) Oral at bedtime  enoxaparin Injectable 40 milliGRAM(s) SubCutaneous every 24 hours  pantoprazole    Tablet 40 milliGRAM(s) Oral before breakfast    MEDICATIONS  (PRN):      FAMILY HISTORY:  FH: CAD (coronary artery disease)      SOCIAL HISTORY:  no history of smoking or any excessive alcohol consumption.    REVIEW OF SYSTEM:  Pertinent items are noted in HPI.  Constitutional	Negative for chills, fevers, sweats.    Eyes: 	Negative for visual disturbance.  Ears, nose, mouth, throat, and face: Negative for epistaxis, nasal congestion, sore throat and tinnitus.  Neck:	Negative for enlargement, pain and difficulty in swallowing  Respiration : Negative for cough, dyspnea on exertion, pleuritic chest pain and wheezing  Cardiovascular: Negative for chest pain, dyspnea and palpitations    Gastrointestinal : Negative for abdominal pain, diarrhea, nausea and vomiting  Genitourinary: Negative for dysuria, frequency and urinary incontinence .  Skin: Negative for  rash, pruritus, swelling, dryness .  	  Hematologic/lymphatic: Negative for bleeding and easy bruising  Musculoskeletal: Negative for arthralgias, back pain and muscle weakness.  Neurological: Negative for dizziness, headaches, seizures and tremors . She had global amnesia and confusion.  Behavioral/Psych: Negative for mood change, depression.  Endocrine:	Negative for blurry vision, polydipsia and polyuria, diaphoresis.   Allergic/Immunologic:	Negative for anaphylaxis, angioedema and urticaria.      Vital Signs Last 24 Hrs  T(C): 36.7 (2019 04:10), Max: 37 (2019 15:30)  T(F): 98 (2019 04:10), Max: 98.6 (2019 15:30)  HR: 67 (2019 04:10) (67 - 90)  BP: 109/48 (2019 04:10) (109/48 - 155/89)  BP(mean): --  RR: 18 (2019 20:30) (18 - 20)  SpO2: 97% (2019 04:10) (97% - 100%)    I&O's Summary    PHYSICAL EXAM  General Appearance: cooperative, no acute distress,   HEENT: PERRL, conjunctiva clear, EOM's intact .  Neck: Supple, , no adenopathy, thyroid: not enlarged, no carotid bruit or JVD  Back: Symmetric, no  tenderness,no soft tissue tenderness  Lungs: Clear to auscultation bilateral,no adventitious breath sounds, normal   expiratory phase  Heart: Regular rate and rhythm, S1, S2 normal, no murmur, rub or gallop  Abdomen: Soft, non-tender, bowel sounds active , no hepatosplenomegaly  Extremities: no cyanosis or edema, no joint swelling  Skin: Skin color, texture normal, no rashes   Neurologic: Alert and oriented X3 , cranial nerves intact, sensory and motor normal,        INTERPRETATION OF TELEMETRY: NSR     ECG: NSR PRWP        LABS:                          13.4   6.51  )-----------( 279      ( 2019 14:02 )             39.0     -    141  |  104  |  23  ----------------------------<  113<H>  3.8   |  25  |  0.93    Ca    9.9      2019 14:02    TPro  8.4<H>  /  Alb  4.4  /  TBili  0.5  /  DBili  x   /  AST  25  /  ALT  36  /  AlkPhos  88  -19            PT/INR - ( 2019 14:02 )   PT: 11.5 sec;   INR: 1.03 ratio         PTT - ( 2019 14:02 )  PTT:30.3 sec  Urinalysis Basic - ( 2019 22:11 )    Color: Yellow / Appearance: Clear / S.005 / pH: x  Gluc: x / Ketone: Small  / Bili: Negative / Urobili: Negative mg/dL   Blood: x / Protein: Negative mg/dL / Nitrite: Negative   Leuk Esterase: Trace / RBC: Negative /HPF / WBC 0-2   Sq Epi: x / Non Sq Epi: Negative / Bacteria: Negative

## 2019-04-20 NOTE — DISCHARGE NOTE PROVIDER - CARE PROVIDERS DIRECT ADDRESSES
[email or web address]_Unknown

## 2019-04-20 NOTE — PROGRESS NOTE ADULT - ASSESSMENT
68 woman PMHx: mitral valve replacement presenting with sudden onset short term memory loss in setting of emotional stressor most consistent with transient global amnesia (TGA). No evidence for CVA, unlikely seizure given no history or risk factors. Improving.

## 2019-04-20 NOTE — CONSULT NOTE ADULT - ASSESSMENT
global amnesia/confusion may be due to stress and medication. Patient has been alert and oriented and now is ambulating without discomfort and is anxious to go home.  High cholesterol.  Dyspnea with moderate to severe exertion for several months.  Suggest  Continue with aspirin and statins.  Consider decreasing dose of gabapentin.  Gradual ambulation.  Will arrange for echocardiogram nuclear stress test as an outpatient.  Discussed with neurologist.

## 2019-04-20 NOTE — SWALLOW BEDSIDE ASSESSMENT ADULT - SWALLOW EVAL: CRITERIA FOR SKILLED INTERVENTION MET
NO NEED FOF SPEECH OR SWALLOWING THERAPY AND SHE IS AT COMMUNICATIVE/SWALLOWING BASELINE. GIVEN ABOVE, WILL NOT ACTIVELY FOLLOW. RECONSULT PRN.

## 2019-04-20 NOTE — DISCHARGE NOTE NURSING/CASE MANAGEMENT/SOCIAL WORK - NSDCPEWEB_GEN_ALL_CORE
[facility] for Tobacco Control website --- [email or web address] website --- [email or web address]

## 2019-04-20 NOTE — DISCHARGE NOTE PROVIDER - NSDCCPCAREPLAN_GEN_ALL_CORE_FT
PRINCIPAL DISCHARGE DIAGNOSIS  Diagnosis: TIA (transient ischemic attack)  Assessment and Plan of Treatment:

## 2019-04-20 NOTE — DISCHARGE NOTE NURSING/CASE MANAGEMENT/SOCIAL WORK - NSDCDPATPORTLINK_GEN_ALL_CORE
You can access the [facility], offered by [facility], by registering with the following website: [email or web address]

## 2019-04-20 NOTE — DISCHARGE NOTE PROVIDER - PROVIDER TOKENS
PROVIDER:[TOKEN:[[id number]]],PROVIDER:[TOKEN:[[id number]]],PROVIDER:[TOKEN:[[id number]]]

## 2019-04-20 NOTE — DISCHARGE NOTE NURSING/CASE MANAGEMENT/SOCIAL WORK - NSDCPEPTSTRK_GEN_ALL_CORE
Prescribed medications/Risk factors for stroke/Stroke support groups for patients, families, and friends/Signs and symptoms of stroke/Stroke warning signs and symptoms/Need for follow up after discharge/Stroke education booklet/Call 911 for stroke

## 2019-04-20 NOTE — DISCHARGE NOTE PROVIDER - HOSPITAL COURSE
Reason for Admission: confusion    History of Present Illness:     67 y/o female with PMHx of spinal stenosis with neuropathy and GERD who presented to  with period of confusion/ amnesia.  History obtained from chart, patient, and son at bedside.  Pt was in her usual state of health up until about 11am this morning.  She was fine yesterday and went to work in [employer].  This morning, her daughter called her saying she was fired from her job.  After this, she was very upset and started talking to other family members.  She then spoke to her daughter again and did not recall all the events of their previous conversation and was not making sense when she was speaking as per the son/ daughter.  She presented to the ER for concern of CVA.  In ER, she was a code stroke.  CT was negative-- no acute CVA.  Pt was seen by Neurology and thought to have Transient Global Amnesia.  MRI was ordered.          Upon questioning ~5pm, patient still confused.  Asking similar questions over and over.  Forgetting things that were just said a few minutes ago.  Does not remember earlier events of the morning.  Does not remember going to work yesterday.  No hx of similar sx in the past.  No weakness/ numbness/ slurred speech.              PHYSICAL EXAM:        Daily       Daily         ICU Vital Signs Last 24 Hrs    T(C): 36.7 (20 Apr 2019 10:03), Max: 36.7 (19 Apr 2019 20:02)    T(F): 98 (20 Apr 2019 10:03), Max: 98 (19 Apr 2019 20:02)    HR: 50 (20 Apr 2019 10:03) (50 - 84)    BP: 134/45 (20 Apr 2019 10:03) (109/48 - 135/89)    BP(mean): --    ABP: --    ABP(mean): --    RR: 18 (20 Apr 2019 10:03) (18 - 20)    SpO2: 99% (20 Apr 2019 10:03) (97% - 100%)            Constitutional: Well appearing    HEENT: Atraumatic, ROSS, Normal, No congestion    Respiratory: Breath Sounds normal, no rhonchi/wheeze    Cardiovascular: N S1S2;     Gastrointestinal: Abdomen soft, non tender, Bowel Sounds present    Extremities: No edema, peripheral pulses present    Neurological: AAO x 3, no gross focal motor deficits    Skin: Non cellulitic, no rash, ulcers    Lymph Nodes: No lymphadenopathy noted    Back: No CVA tenderness     Musculoskeletal: non tender    Breasts: Deferred    Genitourinary: deferred    Rectal: Deferred        Pt admitted with         1.  Confusion/ Amnesia:      Concern for transient global amnesia; however, sx still ongoing for 6+ hours.      CT and CTA head on admission negative.      Neg MRI brain.      sx resolved , pt back to normal    Neuro eval appreciated.      Checked CXR/ UA ; neg    asa, lipitor started; f/u liver profile with pcp in 4 weeks    f/u with Dr. Garcia for out pt stress test        2.  GERD:      cont Prilosec as needed        3.  Spinal stenosis/ Neuropathy:      cont neurontin     stop NSAIDS        pt back to baseline        wants to go home        d/ c home today        total time spent 43 min

## 2019-04-20 NOTE — SWALLOW BEDSIDE ASSESSMENT ADULT - SLP GENERAL OBSERVATIONS
The pt was alert, interactive, and oriented x3+. She stated that she was admitted to hospital after being on phone with a friend who became concerned that her speech sounded impaired. The pt does not recall this event. At the present time, she was able to verbalize [id number] communicative probes and in conversation via intelligible, fluent, linguistically intact, contextually appropriate utterances. No dysarthria, verbal apraxia or aphasia were evident on exam. Pt was able to verbalize her intents and is at [id number] communicative baseline. Note that pt denied Dysphagia. The pt was alert, interactive, and oriented x3+. She stated that she was admitted to hospital after being on phone with a friend who became concerned that her speech sounded impaired. The pt does not recall this event. At the present time, she was able to verbalize during communicative probes and in conversation via intelligible, fluent, linguistically intact, contextually appropriate utterances. No dysarthria, verbal apraxia or aphasia were evident on exam. Pt was able to verbalize her intents and is at reported communicative baseline. Note that pt denied Dysphagia.

## 2019-04-20 NOTE — PROGRESS NOTE ADULT - SUBJECTIVE AND OBJECTIVE BOX
68 woman with mitral valve replacement, no other significant medical history, presenting with amnesia, repeating phrases every 1-2 minutes. She had no dysarthria, aphasia, or nonsensical speech. She denies headache, weakness, numbness, tingling, vision change. No prior illness, no history of stroke or seizure. She denies vascular risk factors, not a smoker. In the ED,  CT head and CTA head/neck unremarkable. Feeling better, able to recall events better.    MEDICATIONS  (STANDING):  aspirin enteric coated 81 milliGRAM(s) Oral daily  atorvastatin 10 milliGRAM(s) Oral at bedtime  enoxaparin Injectable 40 milliGRAM(s) SubCutaneous every 24 hours  pantoprazole    Tablet 40 milliGRAM(s) Oral before breakfast      Neurological Exam:    HF: Patient is alert and oriented x 3. There is no aphasia or dysarthria. Follows complex commands.   CN: Vision is intact to confrontation. Pupils are equal and reactive. Extra ocular muscles are intact. There is no facial droop or asymmetry. Tongue is midline. Sensation is intact in the face. Other CN II-XII are intact.   Motor: motor examination all muscles are 5/5 and there is no pronator drift.   Sensory: intact to and touch.   DTR: 2/4 all 4 extremities. Babinski is negative bilateral.  Co-ord:  Finger to finger to nose is intact.        Radiology report:  CT Head  < from: CT Angio Neck w/ IV Cont (04.19.19 @ 13:58) >  IMPRESSION:          1.   Right carotid system:  No hemodynamically significant stenosis.          2.   Left carotid system:  No hemodynamically significant stenosis.           3.   Intracranial circulation:  No hemodynamically significant   stenosis.        4.   Brain:  No acute infarct or hemorrhage.    < end of copied text >  	  MRI brain    < from: MR Head No Cont (04.19.19 @ 19:42) >  FINDINGS: The brain parenchyma is normal in signal and morphology. There   is no evidence of acute ischemia on the diffusion-weighted images.    Special attention to the medial temporal lobes demonstrates no evidence   of mesial temporal sclerosis, cortical dysplasia, or gray matter   heterotopia.    Ventricular size and configuration is unremarkable. No abnormal extra   axial fluid collections are noted. Flow-voids are noted throughout the   major intracranial vessels, onthe T2 weighted images, consistent with   their patency. There is an incidental 7 mm pineal region cyst.    The paranasal sinuses and mastoid air cells are clear. Calvarial signal   is within normal limits. The orbits appear unremarkable.    IMPRESSION: No acute intracranial hemorrhage, mass effect, or shift of   the midline structures.    Incidental 7 mm pineal region cyst.    < end of copied text >

## 2019-04-20 NOTE — SWALLOW BEDSIDE ASSESSMENT ADULT - SWALLOW EVAL: DIAGNOSIS
1) Pt exhibits functional Oropharyngeal Swallowing for age without overt Dysphagia or aspiration signs.   2) The pt was alert, interactive, and oriented x3+. She stated that she was admitted to hospital after being on phone with a friend who became concerned that her speech sounded impaired. The pt does not recall this event. At the present time, she was able to verbalize [id number] communicative probes and in conversation via intelligible, fluent, linguistically intact, contextually appropriate utterances. No dysarthria, verbal apraxia or aphasia were evident on exam. Pt was able to verbalize her intents and is at [id number] communicative baseline. 1) Pt exhibits functional Oropharyngeal Swallowing for age without overt Dysphagia or aspiration signs.   2) The pt was alert, interactive, and oriented x3+. She stated that she was admitted to hospital after being on phone with a friend who became concerned that her speech sounded impaired. The pt does not recall this event. At the present time, she was able to verbalize during communicative probes and in conversation via intelligible, fluent, linguistically intact, contextually appropriate utterances. No dysarthria, verbal apraxia or aphasia were evident on exam. Pt was able to verbalize her intents and is at reported communicative baseline.

## 2019-04-20 NOTE — DISCHARGE NOTE PROVIDER - CARE PROVIDER_API CALL
Bradley Garcia)  Cardiac Electrophysiology; Cardiovascular Disease; Internal Medicine  [address] [place] [address]  Phone: [phone]  Fax: [phone]  Follow Up Time:     George Campos)  Internal Medicine; Neurology  [address] [place] [address], [address]  [place] [address]  Phone: [phone]  Fax: [phone]  Follow Up Time:     Leoncio Ontiveros)  Medicine  [address] [place] [address]  Phone: [phone]  Fax: [phone]  Follow Up Time:

## 2020-11-30 PROBLEM — K21.9 GASTRO-ESOPHAGEAL REFLUX DISEASE WITHOUT ESOPHAGITIS: Chronic | Status: ACTIVE | Noted: 2019-04-19

## 2020-11-30 PROBLEM — M48.00 SPINAL STENOSIS, SITE UNSPECIFIED: Chronic | Status: ACTIVE | Noted: 2019-04-19

## 2020-12-03 ENCOUNTER — APPOINTMENT (OUTPATIENT)
Dept: ORTHOPEDIC SURGERY | Facility: CLINIC | Age: 70
End: 2020-12-03
Payer: COMMERCIAL

## 2020-12-03 DIAGNOSIS — S99.911A UNSPECIFIED INJURY OF RIGHT ANKLE, INITIAL ENCOUNTER: ICD-10-CM

## 2020-12-03 DIAGNOSIS — M25.571 PAIN IN RIGHT ANKLE AND JOINTS OF RIGHT FOOT: ICD-10-CM

## 2020-12-03 PROCEDURE — 73610 X-RAY EXAM OF ANKLE: CPT | Mod: RT

## 2020-12-03 PROCEDURE — 99072 ADDL SUPL MATRL&STAF TM PHE: CPT

## 2020-12-03 PROCEDURE — 99204 OFFICE O/P NEW MOD 45 MIN: CPT

## 2020-12-03 NOTE — CONSULT LETTER
[Consult Letter:] : I had the pleasure of evaluating your patient, [unfilled]. [Please see my note below.] : Please see my note below. [Consult Closing:] : Thank you very much for allowing me to participate in the care of this patient.  If you have any questions, please do not hesitate to contact me. [Sincerely,] : Sincerely, [FreeTextEntry3] : Jose David Hi, DO\par Foot and Ankle Surgery\par

## 2020-12-03 NOTE — ADDENDUM
[FreeTextEntry1] : I, Ravinder Mullen, acted solely as a scribe for Dr. Jose David Hi on this date 12/03/2020 .\par All medical record entries made by the Scribe were at my, Dr. Jose David Hi, direction and personally dictated by me on 12/03/2020 . I have reviewed the chart and agree that the record accurately reflects my personal performance of the history, physical exam, assessment and plan. I have also personally directed, reviewed, and agreed with the chart.

## 2020-12-03 NOTE — DISCUSSION/SUMMARY
[de-identified] : Today I had a lengthy discussion with the patient regarding their right ankle pain. I have addressed all the patient's concerns surrounding the pathology of their condition. I recommend the patient undergo a course of physical therapy for the right ankle 2-3 times a week for a total of 6-8 weeks. A prescription was given for the physical therapy today.  I recommend that the patient utilize an OTC ankle sleeve. I recommend that the patient utilize Voltaren gel topically. I would like to see the patient back in the office PRN to reassess their condition. The patient understood and verbally agreed to the treatment plan. All of their questions were answered and they were satisfied with the visit. The patient should call the office if they have any questions or experience worsening symptoms.

## 2020-12-03 NOTE — HISTORY OF PRESENT ILLNESS
[FreeTextEntry1] : 70 year old female presenting with right ankle pain. The patient’s pain is noted to be a 2/10. The patient had a fall on the street on 11/19/2020. The patient describes their pain as localized and dull. The pain is improved with rest and ice, and made worse when lying down. She is currently taking NSAIDs and is on gabapentin. No other complaints at this time.

## 2020-12-03 NOTE — PHYSICAL EXAM
[de-identified] : General: Alert and oriented x3. In no acute distress. Pleasant in nature with a normal affect. No apparent respiratory distress.\par \par R Ankle Exam\par Skin: Clean, dry, intact\par Inspection: No obvious malalignment, no swelling, no effusion; no lymphadenopathy\par Pulses: 2+ DP/PT pulses\par ROM: R Ankle 10 degrees of dorsiflexion, 40 degrees of plantarflexion, 10 degrees of subtalar motion\par Tenderness: +Slight pain distal fibula, no pain post tib, no CFL/ATFL/PTFL pain. No medial malleolus pain, no deltoid ligament pain. No proximal fibular pain. No heel pain.\par Stability: Negative anterior/posterior drawer.\par Strength: 5/5 TA/GS/EHL\par Neuro: In tact to light touch throughout\par Additional tests: Negative Mortons test, Negative syndesmosis squeeze test.  [de-identified] : 3V of the right ankle were ordered obtained and reviewed by me today, 12/03/2020 , revealed: No fracture.

## 2020-12-04 RX ORDER — DICLOFENAC SODIUM 1% 10 MG/G
1 GEL TOPICAL
Qty: 1 | Refills: 0 | Status: ACTIVE | COMMUNITY
Start: 2020-12-04 | End: 1900-01-01

## 2021-02-25 ENCOUNTER — EMERGENCY (EMERGENCY)
Facility: HOSPITAL | Age: 71
LOS: 0 days | Discharge: HOME | End: 2021-02-25
Attending: EMERGENCY MEDICINE
Payer: MEDICARE

## 2021-02-25 VITALS
HEART RATE: 86 BPM | OXYGEN SATURATION: 100 % | SYSTOLIC BLOOD PRESSURE: 145 MMHG | RESPIRATION RATE: 18 BRPM | TEMPERATURE: 99 F | DIASTOLIC BLOOD PRESSURE: 84 MMHG

## 2021-02-25 VITALS — WEIGHT: 139.99 LBS | HEIGHT: 63 IN

## 2021-02-25 DIAGNOSIS — Z87.81 PERSONAL HISTORY OF (HEALED) TRAUMATIC FRACTURE: Chronic | ICD-10-CM
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
## 2021-02-25 DIAGNOSIS — K21.9 GASTRO-ESOPHAGEAL REFLUX DISEASE WITHOUT ESOPHAGITIS: ICD-10-CM

## 2021-02-25 DIAGNOSIS — Z98.890 OTHER SPECIFIED POSTPROCEDURAL STATES: Chronic | ICD-10-CM

## 2021-02-25 DIAGNOSIS — Z88.8 ALLERGY STATUS TO OTHER DRUGS, MEDICAMENTS AND BIOLOGICAL SUBSTANCES STATUS: ICD-10-CM

## 2021-02-25 DIAGNOSIS — S52.571A OTHER INTRAARTICULAR FRACTURE OF LOWER END OF RIGHT RADIUS, INITIAL ENCOUNTER FOR CLOSED FRACTURE: ICD-10-CM

## 2021-02-25 DIAGNOSIS — M48.00 SPINAL STENOSIS, SITE UNSPECIFIED: ICD-10-CM
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
## 2021-02-25 PROCEDURE — 73110 X-RAY EXAM OF WRIST: CPT | Mod: RT

## 2021-02-25 PROCEDURE — 99283 EMERGENCY DEPT VISIT LOW MDM: CPT

## 2021-02-25 PROCEDURE — 99284 EMERGENCY DEPT VISIT MOD MDM: CPT | Mod: 25

## 2021-02-25 PROCEDURE — 73080 X-RAY EXAM OF ELBOW: CPT | Mod: RT

## 2021-02-25 PROCEDURE — 73080 X-RAY EXAM OF ELBOW: CPT | Mod: 26,RT

## 2021-02-25 PROCEDURE — 73110 X-RAY EXAM OF WRIST: CPT | Mod: 26,RT,76

## 2021-02-25 PROCEDURE — 25605 CLTX DST RDL FX/EPHYS SEP W/: CPT | Mod: RT

## 2021-02-25 RX ORDER — IBUPROFEN 200 MG
400 TABLET ORAL ONCE
Refills: 0 | Status: COMPLETED | OUTPATIENT
Start: 2021-02-25 | End: 2021-02-25

## 2021-02-25 RX ORDER — ACETAMINOPHEN 500 MG
650 TABLET ORAL EVERY 6 HOURS
Refills: 0 | Status: DISCONTINUED | OUTPATIENT
Start: 2021-02-25 | End: 2021-02-25

## 2021-02-25 RX ADMIN — Medication 650 MILLIGRAM(S): at 12:36

## 2021-02-25 RX ADMIN — Medication 400 MILLIGRAM(S): at 12:36

## 2021-02-25 NOTE — ED STATDOCS - CARE PROVIDER_API CALL
Ashok Ayala)  Orthopaedic Surgery; Surgery of the Hand  [address] [place] [address]  Phone: [phone]  Fax: [phone]  Follow Up Time: 4-6 Days

## 2021-02-25 NOTE — ED STATDOCS - OBJECTIVE STATEMENT
69 y/o female presents to the ED c/o R wrist pain. Pt was walking on the side walk and tripped and fell and landed onto R side. Denies head injury. No LOC. No blood thinners. Pt spoke to Dr. Ayala and was told to go to the ED to meet with him.

## 2021-02-25 NOTE — ED STATDOCS - PHYSICAL EXAMINATION
Constitutional: Elderly female sitting in bed, mild distress, AAOx3  Eyes: PERRLA EOMI  Head: Normocephalic atraumatic  Mouth: MMM  Cardiac: regular rate   Resp: Lungs CTAB  GI: Abd s/nt/nd, no rebound or guarding.  MSK: obvious deformity to R wrist, NVI, cap refill < 2 sec  Neuro: awake, alert, moving all extremities, cranial nerves 2-12 intact, sensation intact, no dysmetria.  Skin: No rashes Constitutional: Elderly female sitting in bed, mild distress, AAOx3  Eyes: PERRLA EOMI  Head: Normocephalic atraumatic  Mouth: MMM  Cardiac: regular rate   Resp: Lungs CTAB  GI: Abd s/nt/nd, no rebound or guarding.  MSK: obvious deformity to R wrist, NVI, cap refill < 2 sec  Neuro: awake, alert, moving all extremities, cranial nerves 2-12 intact, sensation intact, no dysmetria.  Skin: No rashes    PA NOTE: GEN: AOX3, NAD. HEENT: Throat clear. Airway is patent. EYES: PERRLA. EOMI. Head: NC/AT. NECK: Supple, No JVD. FROM. C-spine non-tender. CV:S1S2, RRR, LUNGS: Non-labored breathing, no tachypnea. O2sat 100% RA. CTA b/l. No w/r/r. CHEST: Equal chest expansion and rise. No deformity. ABD: Soft, NT/ND, no rebound, no guarding. No CVAT. EXT: No e/c/c. 2+ distal pulses. RIGHT WRIST: +Obvious deformity right wrist. +Moderate tenderness right wrist, more on distal right radius area. Painful ROM. NVI. 2+ distal pulses. SKIN: No rashes. NEURO: No focal deficits. CN II-XII intact. FROM. 5/5 motor and sensory. ~Kamaljit Goodwin PA-C

## 2021-02-25 NOTE — ED STATDOCS - CLINICAL SUMMARY MEDICAL DECISION MAKING FREE TEXT BOX
69 y/o F here s/p slip and fall, did not hit head, no loc, c/o R wrist pain, obtain X ray she called Dr. Ayala PTA to see pt, NVI. 71 y/o F here s/p slip and fall, did not hit head, no loc, c/o R wrist pain, obtain X ray she called Dr. Ayala PTA to see pt, NVI.    PA note: All xray studies reviewed in details with patient. Patient re-examined and re-evaluated. Patient feels much better at this time. ED evaluation, Diagnosis and management discussed with the patient in detail. Workup results discussed with ED attending, OK to [place] home. Close HAND  follow up encouraged.  Strict ED return instructions discussed in detail and patient given the opportunity to ask any questions about their discharge diagnosis and instructions. Patient verbalized understanding. ~ Kamaljit Goodwin PA-C

## 2021-02-25 NOTE — ED STATDOCS - PATIENT PORTAL LINK FT
You can access the FollowMyHealth Patient Portal offered by [facility] by registering at the following website: [email or web address]. By joining [employer]’s FollowMyHealth portal, you will also be able to view your health information using other applications (apps) compatible with our system.

## 2021-02-25 NOTE — ED STATDOCS - NS ED ROS FT
Constitutional: No fever or chills  Eyes: No visual changes  HEENT: No throat pain  CV: No chest pain  Resp: No SOB no cough  GI: No abd pain, nausea or vomiting  : No dysuria  MSK: +R wrist pain  Skin: No rash  Neuro: No headache

## 2021-02-25 NOTE — ED STATDOCS - NSFOLLOWUPINSTRUCTIONS_ED_ALL_ED_FT
WRIST FRACTURE IN ADULTS - General Information           Wrist Fracture in Adults    WHAT YOU NEED TO KNOW:    What is a wrist fracture? A wrist fracture is a break in one or more of the bones in your wrist.     Adult Arm Bones         What are the signs and symptoms of a wrist fracture?   •Pain, swelling, and bruising of your injured wrist      •Wrist pain that is worse when you hold something or put pressure on your wrist      •Weakness, numbness, or tingling in your injured hand or wrist      •Trouble moving your wrist, hand, or fingers      •A change in the shape of your wrist      How is a wrist fracture diagnosed? Your healthcare provider will examine you. You may need an x-ray, CT scan, or MRI. You may be given contrast liquid to help your wrist bones show up better in pictures. Tell the healthcare provider if you have ever had an allergic reaction to contrast liquid. Do not enter the MRI room with anything metal. Metal can cause serious injury. Tell the healthcare provider if you have any metal in or on your body.    How is a wrist fracture treated? Treatment will depend on which wrist bone was broken and the kind of fracture you have. You may need any of the following:   •Medicine may be given to decrease pain and swelling. You may need antibiotic medicine or a tetanus shot if there is a break in your skin.      •A cast, splint, or brace may be placed on your wrist to decrease movement. These devices will help hold the bones in place while they heal.      •Traction may be needed if your bone broke into 2 pieces. Traction pulls on the bone pieces to pull them back into place. A pin may be put in your bone or cast and hooked to ropes and a pulley. Weight is hung on the rope to help pull on the bones so they will heal correctly.      •A closed reduction is a procedure to put your bones into the correct position without surgery.      •Surgery may be needed to put your bones back into the correct position. Wires, pins, plates or screws may be used to help hold the bones in place.      How can I manage my symptoms?   •Rest as much as possible. Do not play contact sports until the healthcare provider says it is okay.       •Apply ice on your wrist for 15 to 20 minutes every hour or as directed. Use an ice pack, or put crushed ice in a plastic bag. Cover it with a towel before you place it on your skin. Ice helps prevent tissue damage and decreases swelling and pain.      •Elevate your wrist above the level of your heart as often as possible. This will help decrease swelling and pain. Prop your wrist on pillows or blankets to keep it elevated comfortably.             •Go to physical therapy as directed. You may need physical therapy after your wrist heals and the cast is removed. A physical therapist can teach you exercises to help improve movement and strength and to decrease pain.      When should I seek immediate care?   •Your pain gets worse or does not get better after you take pain medicine.      •Your cast or splint breaks, gets wet, or is damaged.      •Your hand or fingers feel numb or cold.      •Your hand or fingers turn white or blue.      •Your splint or cast feels too tight.      •You have more pain or swelling after the cast or splint is put on.      When should I call my doctor?   •You have a fever.      •There is a foul smell or blood coming from under the cast.      •You have questions or concerns about your condition or care.      CARE AGREEMENT:    You have the right to help plan your care. Learn about your health condition and how it may be treated. Discuss treatment options with your healthcare providers to decide what care you want to receive. You always have the right to refuse treatment.

## 2021-02-25 NOTE — ED ADULT NURSE NOTE - OBJECTIVE STATEMENT
presents to ed with right wrist injury patient slipped and fell in a parking lot. sent to meet dr. hillman. [facility]

## 2021-02-25 NOTE — ED STATDOCS - PROGRESS NOTE DETAILS
PA: Patient seen by Dr. Ayala, s/p closed reduction. Patient may need surgery, to be scheduled electively. ~Kamaljit Goodwin PA-C PA: Patient is a 69 y/o female with PMHx of GERD, neuropathy, spinal stenosis who presents to [facility] c/o right wrist injury s/p trip and fall. Patient was walking on the side walk and tripped and fell and landed onto right side. DENIES head/neck injury. No LOC. No blood thinners. Pt spoke to Dr. Ayala and was told to go to the ED to meet with him. ~Kamaljit Goodwin PA-C   Patient seen and evaluated in . Will get xrays, pain control. Hand consult. Reassess. ~Kamaljit Goodwin PA-C PA note: All xray studies reviewed in details with patient. Patient re-examined and re-evaluated. Patient feels much better at this time. ED evaluation, Diagnosis and management discussed with the patient in detail. Workup results discussed with ED attending, OK to [place] home. Close HAND  follow up encouraged.  Strict ED return instructions discussed in detail and patient given the opportunity to ask any questions about their discharge diagnosis and instructions. Patient verbalized understanding. ~ Kamaljit Goodwin PA-C Right wrist Xrays: +Distal radius fracture. ~Kamaljit Goodwin PA-C

## 2021-02-25 NOTE — ED ADULT NURSE NOTE - NSIMPLEMENTINTERV_GEN_ALL_ED
Implemented All Universal Safety Interventions:  [place] to call system. Call bell, personal items and telephone within reach. Instruct patient to call for assistance. Room bathroom lighting operational. Non-slip footwear when patient is off stretcher. Physically safe environment: no spills, clutter or unnecessary equipment. Stretcher in lowest position, wheels locked, appropriate side rails in place. Cephalexin Counseling: I counseled the patient regarding use of cephalexin as an antibiotic for prophylactic and/or therapeutic purposes. Cephalexin (commonly prescribed under brand name Keflex) is a cephalosporin antibiotic which is active against numerous classes of bacteria, including most skin bacteria. Side effects may include nausea, diarrhea, gastrointestinal upset, rash, hives, yeast infections, and in rare cases, hepatitis, kidney disease, seizures, fever, confusion, neurologic symptoms, and others. Patients with severe allergies to penicillin medications are cautioned that there is about a 10% incidence of cross-reactivity with cephalosporins. When possible, patients with penicillin allergies should use alternatives to cephalosporins for antibiotic therapy.

## 2021-02-25 NOTE — ED STATDOCS - ATTENDING CONTRIBUTION TO CARE
I, Sayda Torres MD, performed the initial face to face bedside interview with this patient regarding history of present illness, review of symptoms and relevant past medical, social and family history.  I completed an independent physical examination.  I was the initial provider who evaluated this patient. I have signed out the follow up of any pending tests (i.e. labs, radiological studies) to the ACP.  I have communicated the patient’s plan of care and disposition with the ACP.  The history, relevant review of systems, past medical and surgical history, medical decision making, and physical examination was documented by the scribe in my presence and I attest to the accuracy of the documentation.

## 2021-03-05 ENCOUNTER — OUTPATIENT (OUTPATIENT)
Dept: EMERGENCY DEPT | Facility: HOSPITAL | Age: 71
LOS: 1 days | Discharge: HOME | End: 2021-03-05
Payer: MEDICARE

## 2021-03-05 ENCOUNTER — TRANSCRIPTION ENCOUNTER (OUTPATIENT)
Age: 71
End: 2021-03-05

## 2021-03-05 VITALS
RESPIRATION RATE: 18 BRPM | OXYGEN SATURATION: 96 % | DIASTOLIC BLOOD PRESSURE: 94 MMHG | TEMPERATURE: 99 F | HEART RATE: 84 BPM | SYSTOLIC BLOOD PRESSURE: 164 MMHG

## 2021-03-05 VITALS
DIASTOLIC BLOOD PRESSURE: 63 MMHG | RESPIRATION RATE: 16 BRPM | OXYGEN SATURATION: 98 % | HEART RATE: 70 BPM | TEMPERATURE: 98 F | SYSTOLIC BLOOD PRESSURE: 120 MMHG

## 2021-03-05 DIAGNOSIS — S62.109A FRACTURE OF UNSPECIFIED CARPAL BONE, UNSPECIFIED WRIST, INITIAL ENCOUNTER FOR CLOSED FRACTURE: ICD-10-CM

## 2021-03-05 DIAGNOSIS — Z98.890 OTHER SPECIFIED POSTPROCEDURAL STATES: Chronic | ICD-10-CM

## 2021-03-05 DIAGNOSIS — Z87.81 PERSONAL HISTORY OF (HEALED) TRAUMATIC FRACTURE: Chronic | ICD-10-CM

## 2021-03-05 LAB
ANION GAP SERPL CALC-SCNC: 5 MMOL/L — SIGNIFICANT CHANGE UP (ref 5–17)
APTT BLD: 34.7 SEC — SIGNIFICANT CHANGE UP (ref 27.5–35.5)
BUN SERPL-MCNC: 24 MG/DL — HIGH (ref 7–23)
CALCIUM SERPL-MCNC: 9.8 MG/DL — SIGNIFICANT CHANGE UP (ref 8.5–10.1)
CHLORIDE SERPL-SCNC: 107 MMOL/L — SIGNIFICANT CHANGE UP (ref 96–108)
CO2 SERPL-SCNC: 30 MMOL/L — SIGNIFICANT CHANGE UP (ref 22–31)
CREAT SERPL-MCNC: 0.99 MG/DL — SIGNIFICANT CHANGE UP (ref 0.5–1.3)
GLUCOSE SERPL-MCNC: 95 MG/DL — SIGNIFICANT CHANGE UP (ref 70–99)
HCT VFR BLD CALC: 41.3 % — SIGNIFICANT CHANGE UP (ref 34.5–45)
HGB BLD-MCNC: 13.6 G/DL — SIGNIFICANT CHANGE UP (ref 11.5–15.5)
INR BLD: 1.01 RATIO — SIGNIFICANT CHANGE UP (ref 0.88–1.16)
MCHC RBC-ENTMCNC: 30.8 PG — SIGNIFICANT CHANGE UP (ref 27–34)
MCHC RBC-ENTMCNC: 32.9 GM/DL — SIGNIFICANT CHANGE UP (ref 32–36)
MCV RBC AUTO: 93.4 FL — SIGNIFICANT CHANGE UP (ref 80–100)
PLATELET # BLD AUTO: 323 K/UL — SIGNIFICANT CHANGE UP (ref 150–400)
POTASSIUM SERPL-MCNC: 4.3 MMOL/L — SIGNIFICANT CHANGE UP (ref 3.5–5.3)
POTASSIUM SERPL-SCNC: 4.3 MMOL/L — SIGNIFICANT CHANGE UP (ref 3.5–5.3)
PROTHROM AB SERPL-ACNC: 11.8 SEC — SIGNIFICANT CHANGE UP (ref 10.6–13.6)
RBC # BLD: 4.42 M/UL — SIGNIFICANT CHANGE UP (ref 3.8–5.2)
RBC # FLD: 12.6 % — SIGNIFICANT CHANGE UP (ref 10.3–14.5)
SARS-COV-2 RNA SPEC QL NAA+PROBE: SIGNIFICANT CHANGE UP
SODIUM SERPL-SCNC: 142 MMOL/L — SIGNIFICANT CHANGE UP (ref 135–145)
WBC # BLD: 4.68 K/UL — SIGNIFICANT CHANGE UP (ref 3.8–10.5)
WBC # FLD AUTO: 4.68 K/UL — SIGNIFICANT CHANGE UP (ref 3.8–10.5)

## 2021-03-05 PROCEDURE — 73110 X-RAY EXAM OF WRIST: CPT | Mod: 26,RT

## 2021-03-05 PROCEDURE — 71045 X-RAY EXAM CHEST 1 VIEW: CPT | Mod: 26

## 2021-03-05 PROCEDURE — 93010 ELECTROCARDIOGRAM REPORT: CPT

## 2021-03-05 PROCEDURE — 99222 1ST HOSP IP/OBS MODERATE 55: CPT

## 2021-03-05 PROCEDURE — C1713: CPT

## 2021-03-05 PROCEDURE — 71045 X-RAY EXAM CHEST 1 VIEW: CPT

## 2021-03-05 PROCEDURE — 76000 FLUOROSCOPY <1 HR PHYS/QHP: CPT | Mod: 26

## 2021-03-05 PROCEDURE — 76000 FLUOROSCOPY <1 HR PHYS/QHP: CPT

## 2021-03-05 PROCEDURE — 73110 X-RAY EXAM OF WRIST: CPT | Mod: RT

## 2021-03-05 RX ORDER — OMEPRAZOLE 10 MG/1
1 CAPSULE, DELAYED RELEASE ORAL
Qty: 0 | Refills: 0 | DISCHARGE

## 2021-03-05 RX ORDER — ONDANSETRON 8 MG/1
4 TABLET, FILM COATED ORAL ONCE
Refills: 0 | Status: DISCONTINUED | OUTPATIENT
Start: 2021-03-05 | End: 2021-03-05

## 2021-03-05 RX ORDER — OXYCODONE HYDROCHLORIDE 5 MG/1
5 TABLET ORAL ONCE
Refills: 0 | Status: DISCONTINUED | OUTPATIENT
Start: 2021-03-05 | End: 2021-03-05

## 2021-03-05 RX ORDER — PANTOPRAZOLE SODIUM 20 MG/1
40 TABLET, DELAYED RELEASE ORAL DAILY
Refills: 0 | Status: DISCONTINUED | OUTPATIENT
Start: 2021-03-05 | End: 2021-03-05

## 2021-03-05 RX ORDER — HYDROMORPHONE HYDROCHLORIDE 2 MG/ML
0.5 INJECTION INTRAMUSCULAR; INTRAVENOUS; SUBCUTANEOUS
Refills: 0 | Status: DISCONTINUED | OUTPATIENT
Start: 2021-03-05 | End: 2021-03-05

## 2021-03-05 RX ORDER — SODIUM CHLORIDE 9 MG/ML
1000 INJECTION, SOLUTION INTRAVENOUS
Refills: 0 | Status: DISCONTINUED | OUTPATIENT
Start: 2021-03-05 | End: 2021-03-05

## 2021-03-05 RX ORDER — ATORVASTATIN CALCIUM 80 MG/1
1 TABLET, FILM COATED ORAL
Qty: 0 | Refills: 0 | DISCHARGE

## 2021-03-05 RX ORDER — GABAPENTIN 400 MG/1
1 CAPSULE ORAL
Qty: 0 | Refills: 0 | DISCHARGE

## 2021-03-05 RX ORDER — GABAPENTIN 400 MG/1
100 CAPSULE ORAL AT BEDTIME
Refills: 0 | Status: DISCONTINUED | OUTPATIENT
Start: 2021-03-05 | End: 2021-03-05

## 2021-03-05 RX ORDER — ACETAMINOPHEN 500 MG
1000 TABLET ORAL ONCE
Refills: 0 | Status: COMPLETED | OUTPATIENT
Start: 2021-03-05 | End: 2021-03-05

## 2021-03-05 RX ORDER — ATORVASTATIN CALCIUM 80 MG/1
10 TABLET, FILM COATED ORAL AT BEDTIME
Refills: 0 | Status: DISCONTINUED | OUTPATIENT
Start: 2021-03-05 | End: 2021-03-05

## 2021-03-05 RX ORDER — MEPERIDINE HYDROCHLORIDE 50 MG/ML
12.5 INJECTION INTRAMUSCULAR; INTRAVENOUS; SUBCUTANEOUS
Refills: 0 | Status: DISCONTINUED | OUTPATIENT
Start: 2021-03-05 | End: 2021-03-05

## 2021-03-05 RX ADMIN — OXYCODONE HYDROCHLORIDE 5 MILLIGRAM(S): 5 TABLET ORAL at 17:18

## 2021-03-05 RX ADMIN — HYDROMORPHONE HYDROCHLORIDE 0.5 MILLIGRAM(S): 2 INJECTION INTRAMUSCULAR; INTRAVENOUS; SUBCUTANEOUS at 16:45

## 2021-03-05 RX ADMIN — Medication 400 MILLIGRAM(S): at 17:11

## 2021-03-05 RX ADMIN — HYDROMORPHONE HYDROCHLORIDE 0.5 MILLIGRAM(S): 2 INJECTION INTRAMUSCULAR; INTRAVENOUS; SUBCUTANEOUS at 16:40

## 2021-03-05 RX ADMIN — Medication 1000 MILLIGRAM(S): at 17:11

## 2021-03-05 RX ADMIN — HYDROMORPHONE HYDROCHLORIDE 0.5 MILLIGRAM(S): 2 INJECTION INTRAMUSCULAR; INTRAVENOUS; SUBCUTANEOUS at 16:50

## 2021-03-05 RX ADMIN — SODIUM CHLORIDE 100 MILLILITER(S): 9 INJECTION, SOLUTION INTRAVENOUS at 17:04

## 2021-03-05 RX ADMIN — SODIUM CHLORIDE 75 MILLILITER(S): 9 INJECTION, SOLUTION INTRAVENOUS at 10:09

## 2021-03-05 NOTE — H&P ADULT - HISTORY OF PRESENT ILLNESS
70F presents to ED sent in by Dr Ayala for planned surgical fixation of right distal radius fx s/p fall 8 days ago. Pt is RHD. denies N/T RUE, no complaints of pain currently. last ate yesterday evening, has been NPO. no other complaints    PAST MEDICAL & SURGICAL HISTORY:  Chronic GERD    Spinal stenosis    Neuropathic pain, leg, left    H/O varicose vein stripping    H/O fracture of wrist

## 2021-03-05 NOTE — ASU DISCHARGE PLAN (ADULT/PEDIATRIC) - ASU DC SPECIAL INSTRUCTIONSFT
ORIF DC Instructions:    1.	Analgesia  2.	Non-Weight Bearing  Right Upper Extremity, with assistive device/rolling walker  3.	Follow up with Orthopedic Surgeon Dr. Ayala in 10 Days after Discharge from the Hospital. Call Office For Appointment.  4.	Sutures to be removed Post-Op Day 14, and repeat x-rays in office.  5.	Elevate the extremity as much as possible  6.	Keep bandage/Splint Clean and dry. Do not get it wet. Do not put any body weight on splint because it will break.

## 2021-03-05 NOTE — CONSULT NOTE ADULT - SUBJECTIVE AND OBJECTIVE BOX
PCP:    CHIEF COMPLAINT: mechanical fall, right wrist pain    HISTORY OF THE PRESENT ILLNESS: this is a 71 yo female RHD with PMH  TIA 2 years ago on ASA 81 mg po daily, spinal stenosis with neuropathy LLE, h/o left wrist fx from a fall 2 1/2 yrs ago, HLD who was sent to ER by Dr Ayala for fixation of right wrist fracture. Pt had mechanical fall 8 days ago outside after receiving her covid vaccine. She denies any dizziness or light headedness prior to the fall.  She came to the ER that day was casted and discharged home with instructions to f/u with Dr Ayala. .  She is now seen in the ER, denies any CP OR SOB. Presently NPO awaiting OR today for ORIF.  We are consulted for medical management    PAST MEDICAL HISTORY: as above    PAST SURGICAL HISTORY:  left wrist ORIF, D&C    FAMILY HISTORY: Mother dec age 84 from CAD, MI, Father dec CAD, CHF age 77    SOCIAL HISTORY:  no smoking, no alcohol, no drugs    ALLERGIES: Nifedipine    HOME MEDS: see med rec    REVIEW OF SYSTEMS:   All 10 systems reviewed in detailed and found to be negative with the exception of what has already been described above    MEDICATIONS  (STANDING):  atorvastatin 10 milliGRAM(s) Oral at bedtime  gabapentin 100 milliGRAM(s) Oral at bedtime  lactated ringers. 1000 milliLiter(s) (75 mL/Hr) IV Continuous <Continuous>  pantoprazole    Tablet 40 milliGRAM(s) Oral daily    MEDICATIONS  (PRN):    MEDICATIONS  (STANDING):  atorvastatin 10 milliGRAM(s) Oral at bedtime  gabapentin 100 milliGRAM(s) Oral at bedtime  lactated ringers. 1000 milliLiter(s) (75 mL/Hr) IV Continuous <Continuous>  pantoprazole    Tablet 40 milliGRAM(s) Oral daily        VITALS:  T(F): 98.9 (03-05-21 @ 10:59), Max: 99.3 (03-05-21 @ 08:56)  HR: 94 (03-05-21 @ 10:59) (84 - 94)  BP: 145/60 (03-05-21 @ 10:59) (145/60 - 164/94)  RR: 17 (03-05-21 @ 10:59) (17 - 18)  SpO2: 97% (03-05-21 @ 10:59) (96% - 97%)  Wt(kg): --    I&O's Summary        PHYSICAL EXAM:    GENERAL: Comfortable, no acute distress   HEAD:  Normocephalic, atraumatic  EYES: EOMI, PERRLA  HEENT: Moist mucous membranes  NECK: Supple, No JVD  NERVOUS SYSTEM:  Alert & Oriented X3, Motor Strength 5/5 B/L upper and lower extremities  CHEST/LUNG: Clear to auscultation bilaterally  HEART: Regular rate and rhythm  ABDOMEN: Soft, non tender, Nondistended, Bowel sounds present  GENITOURINARY: Voiding, no palpable bladder  EXTREMITIES:   No clubbing, cyanosis, or edema  MUSCULOSKELETAL-right wrist casted with right thumb with sm amt ecchymosis, fingers warm and mobile, + sensation  SKIN-no rash        LABS:                        13.6   4.68  )-----------( 323      ( 05 Mar 2021 09:20 )             41.3     03-05    142  |  107  |  24<H>  ----------------------------<  95  4.3   |  30  |  0.99    Ca    9.8      05 Mar 2021 09:20    PT/INR - ( 05 Mar 2021 09:20 )   PT: 11.8 sec;   INR: 1.01 ratio       PTT - ( 05 Mar 2021 09:20 )  PTT:34.7 sec      EKG:   NSR    RADIOLOGY STUDIES:     < from: Xray Wrist 3 Views, Right (02.25.21 @ 12:39) >    EXAM:  XR WRIST COMP MIN 3 VIEWS RT                            PROCEDURE DATE:  02/25/2021          INTERPRETATION:  Radiographs of the RIGHT wrist    CLINICAL INFORMATION: Pain.    TECHNIQUE:  Frontal, oblique and lateral views of the wrist were obtained.    FINDINGS:   No prior examinations are available for review.    The osseous structures of the wrist are significant for an impacted comminuted fracture of the distal radial metaphysis with ventral angulation..   Intercarpal spacing and alignment are preserved.  The common carpal metacarpal and first carpal metacarpal joints appear intact.    Mild soft tissue swelling is seen.  No radiopaque foreign body is seen.    IMPRESSION:   impacted comminuted fracture of the distal radial metaphysis with ventral angulation..    < end of copied text >  < from: Xray Elbow AP + Lateral, Right (02.25.21 @ 12:40) >    EXAM:  XR ELBOW COMP MIN 3V RT                            PROCEDURE DATE:  02/25/2021          INTERPRETATION:  Radiographs of the RIGHT elbow    CLINICAL INFORMATION: Fall Pain.    TECHNIQUE:  Frontal, oblique and lateral views of the elbow were obtained.    FINDINGS:   No prior examinations are available for review.    The osseous structures of the elbow are intact, without fracture or destructive lesion.   No joint effusion is seen.  No loose body is found.    No soft tissue abnormalities are seen.  No radiopaque foreign body is seen.    IMPRESSION:   Unremarkable radiographs of the elbow.          IMPRESSION:  71 yo with above pmh a/w:  # right wrist fx  # ORIF planned for today  NPO  IVF's  Pain control  resume ASA postop  Covid PCR: Not detected 3/5  Medically optimized for surgery: estimated risk of adverse Outcome with Non cardiac surgery is low.  Estimated risk of MI, Pulm edema, V fib, cardiac arrest or CHB is  0.9%      # HLD  cont statin      # Spinal stenosis/neuropathy  cont gabapentin    advanced directives: full code    Dispo: OR today    Thank you for th consult, will follow with you   PCP:    CHIEF COMPLAINT: mechanical fall, right wrist pain    HISTORY OF THE PRESENT ILLNESS: this is a 71 yo female RHD with PMH  TIA 2 years ago on ASA 81 mg po daily, spinal stenosis with neuropathy LLE, h/o left wrist fx from a fall 2 1/2 yrs ago, HLD who was sent to ER by Dr Ayala for fixation of right wrist fracture. Pt had mechanical fall 8 days ago outside after receiving her covid vaccine. She denies any dizziness or light headedness prior to the fall.  She came to the ER that day was casted and discharged home with instructions to f/u with Dr Ayala. .  She is now seen in the ER, denies any CP OR SOB. Presently NPO awaiting OR today for ORIF.  We are consulted for medical management    PAST MEDICAL HISTORY: as above    PAST SURGICAL HISTORY:  left wrist ORIF, D&C    FAMILY HISTORY: Mother dec age 84 from CAD, MI, Father dec CAD, CHF age 77    SOCIAL HISTORY:  no smoking, no alcohol, no drugs    ALLERGIES: Nifedipine    HOME MEDS: see med rec    REVIEW OF SYSTEMS:   All 10 systems reviewed in detailed and found to be negative with the exception of what has already been described above    MEDICATIONS  (STANDING):  atorvastatin 10 milliGRAM(s) Oral at bedtime  gabapentin 100 milliGRAM(s) Oral at bedtime  lactated ringers. 1000 milliLiter(s) (75 mL/Hr) IV Continuous <Continuous>  pantoprazole    Tablet 40 milliGRAM(s) Oral daily    MEDICATIONS  (PRN):    MEDICATIONS  (STANDING):  atorvastatin 10 milliGRAM(s) Oral at bedtime  gabapentin 100 milliGRAM(s) Oral at bedtime  lactated ringers. 1000 milliLiter(s) (75 mL/Hr) IV Continuous <Continuous>  pantoprazole    Tablet 40 milliGRAM(s) Oral daily        VITALS:  T(F): 98.9 (03-05-21 @ 10:59), Max: 99.3 (03-05-21 @ 08:56)  HR: 94 (03-05-21 @ 10:59) (84 - 94)  BP: 145/60 (03-05-21 @ 10:59) (145/60 - 164/94)  RR: 17 (03-05-21 @ 10:59) (17 - 18)  SpO2: 97% (03-05-21 @ 10:59) (96% - 97%)  Wt(kg): --    I&O's Summary        PHYSICAL EXAM:    GENERAL: Comfortable, no acute distress   HEAD:  Normocephalic, atraumatic  EYES: EOMI, PERRLA  HEENT: Moist mucous membranes  NECK: Supple, No JVD  NERVOUS SYSTEM:  Alert & Oriented X3, Motor Strength 5/5 B/L upper and lower extremities  CHEST/LUNG: Clear to auscultation bilaterally  HEART: Regular rate and rhythm  ABDOMEN: Soft, non tender, Nondistended, Bowel sounds present  GENITOURINARY: Voiding, no palpable bladder  EXTREMITIES:   No clubbing, cyanosis, or edema  MUSCULOSKELETAL-right wrist casted with right thumb with sm amt ecchymosis, fingers warm and mobile, + sensation  SKIN-no rash        LABS:                        13.6   4.68  )-----------( 323      ( 05 Mar 2021 09:20 )             41.3     03-05    142  |  107  |  24<H>  ----------------------------<  95  4.3   |  30  |  0.99    Ca    9.8      05 Mar 2021 09:20    PT/INR - ( 05 Mar 2021 09:20 )   PT: 11.8 sec;   INR: 1.01 ratio       PTT - ( 05 Mar 2021 09:20 )  PTT:34.7 sec      EKG:   NSR    RADIOLOGY STUDIES:     < from: Xray Wrist 3 Views, Right (02.25.21 @ 12:39) >    EXAM:  XR WRIST COMP MIN 3 VIEWS RT                            PROCEDURE DATE:  02/25/2021          INTERPRETATION:  Radiographs of the RIGHT wrist    CLINICAL INFORMATION: Pain.    TECHNIQUE:  Frontal, oblique and lateral views of the wrist were obtained.    FINDINGS:   No prior examinations are available for review.    The osseous structures of the wrist are significant for an impacted comminuted fracture of the distal radial metaphysis with ventral angulation..   Intercarpal spacing and alignment are preserved.  The common carpal metacarpal and first carpal metacarpal joints appear intact.    Mild soft tissue swelling is seen.  No radiopaque foreign body is seen.    IMPRESSION:   impacted comminuted fracture of the distal radial metaphysis with ventral angulation..    < end of copied text >  < from: Xray Elbow AP + Lateral, Right (02.25.21 @ 12:40) >    EXAM:  XR ELBOW COMP MIN 3V RT                            PROCEDURE DATE:  02/25/2021          INTERPRETATION:  Radiographs of the RIGHT elbow    CLINICAL INFORMATION: Fall Pain.    TECHNIQUE:  Frontal, oblique and lateral views of the elbow were obtained.    FINDINGS:   No prior examinations are available for review.    The osseous structures of the elbow are intact, without fracture or destructive lesion.   No joint effusion is seen.  No loose body is found.    No soft tissue abnormalities are seen.  No radiopaque foreign body is seen.    IMPRESSION:   Unremarkable radiographs of the elbow.          IMPRESSION:  71 yo with above pmh a/w:  # right wrist fx  # ORIF planned for today  NPO  IVF's  Pain control  resume ASA postop  Covid PCR: Not detected 3/5  Medically optimized for surgery: estimated risk of adverse Outcome with Non cardiac surgery is low.  Estimated risk of MI, Pulm edema, V fib, cardiac arrest or CHB is  0.9%      # HLD  cont statin      # Spinal stenosis/neuropathy  cont gabapentin    #advanced directives: full code    #COVID- patient completed 2 shots of Moderna vaccine    Dispo: OR today    Thank you for the consult, will follow with you

## 2021-03-05 NOTE — DISCHARGE NOTE PROVIDER - NSDCMRMEDTOKEN_GEN_ALL_CORE_FT
aspirin 81 mg oral delayed release tablet: 1 tab(s) orally once a day  atorvastatin 20 mg oral tablet: 1 tab(s) orally once a day  betamethasone dipropionate 0.05% topical cream: Apply topically to affected area 2 times a day, As Needed for psoriasis  gabapentin 100 mg oral capsule: 1 cap(s) orally once a day (at bedtime)  Multiple Vitamins oral tablet: 1 tab(s) orally once a day  omeprazole 20 mg oral delayed release capsule: 1 cap(s) orally once a day

## 2021-03-05 NOTE — ED ADULT TRIAGE NOTE - CHIEF COMPLAINT QUOTE
pt presents to ED due to right arm fx 1 week ago, met with MD Goodson 1 week ago right arm in soft cast splint no pain meds today

## 2021-03-05 NOTE — DISCHARGE NOTE PROVIDER - NSDCFUADDINST_GEN_ALL_CORE_FT
ORIF DC Instructions:    1.	Analgesia  2.	Non-Weight Bearing Right Upper Extremity  3.	Ice as tolerated  4.	Keep dressing/splint dry  5.	Follow up with Orthopedic Surgeon Dr. Ayala in 10 Days after Discharge from the Hospital. Call Office For Appointment.  6.	Sutures to be removed Post-Op Day 14, and repeat x-rays in office.  7.	Elevate the extremity as much as possible  8.	Keep bandage/Splint Clean and dry. Do not get it wet. Do not put any body weight on splint because it will break.

## 2021-03-05 NOTE — PHARMACOTHERAPY INTERVENTION NOTE - COMMENTS
Completed medication history with the patient and Dr First [facility]. All medication-related questions were addressed.

## 2021-03-05 NOTE — ED ADULT NURSE NOTE - ED STAT RN HAND OFF
Hospital Medicine  Progress note    I personally performed the history, physical exam and medical decision making: and confirmed the accuracy of the information in the transcribed note.  Mohsen Astorga M.D.  Pager: [phone]  Cell Phone: [phone]    Team: [facility] HOSP MED B Mohsen Astorga MD  Admit Date: 8/1/2018  KRISHNA 8/7/2018  Code status: Full Code    Principal Problem:  Catatonia    Interval hx: Psych states that patient is much improved yesterday, alert and oriented x4, linear, organized and demonstrates spontaneous speech. Has been catatonic every visit for hospital medicine. ID consulted for recommendations.    ROS   Unable to perform ROS: Mental status change       PEx  Temp:  [96.2 °F (35.7 °C)-98.1 °F (36.7 °C)]   Pulse:  []   Resp:  [17-18]   BP: (107-160)/(58-87)   SpO2:  [97 %-100 %]   No intake or output data in the 24 hours ending 08/05/18 1121     Constitutional: She appears well-developed and well-nourished. No distress. obese   Cardiovascular: Normal rate, regular rhythm, normal heart sounds and intact distal pulses.  Exam reveals no gallop and no friction rub.    No murmur heard.  Pulmonary/Chest: Effort normal and breath sounds normal. No respiratory distress. She has no wheezes. She has no rales. Room air   Abdominal: Soft. Bowel sounds are normal. She exhibits no distension and no mass. There is no tenderness.  Neurological: She is alert. She has normal reflexes. She displays no atrophy and no tremor. She exhibits normal muscle tone. She displays no seizure activity.   Does not respond to commands. Blinked to confrontation once, but then did not. Slightly molded her hand around mine on the R. No response with the L hand. No response to Babinski   Skin: Skin is warm and dry. No rash noted. She is not diaphoretic. No erythema. No pallor.   Nursing note and vitals reviewed.      Recent Labs  Lab 08/03/18  0418 08/04/18  0517 08/05/18  0608   WBC 7.52 9.75 8.77   HGB 12.1 13.0 12.4   HCT 35.8*  39.2 36.8*    171 158       Recent Labs  Lab 08/01/18  2219  08/03/18  0418 08/04/18  0517 08/05/18  0608     < > 141 140 139   K 4.5  < > 4.0 4.1 4.3     < > 109 109 111*   CO2 25  < > 24 24 20*   BUN 5*  < > 3* 2* 3*   CREATININE 0.6  < > 0.5 0.5 0.6     < > 100 98 103   CALCIUM 8.9  < > 8.8 9.1 9.1   MG 2.3  --   --   --   --    PHOS 3.3  --   --   --   --    < > = values in this interval not displayed.    Recent Labs  Lab 08/01/18 2219 08/01/18  2220   ALKPHOS 52*  --    ALT 10  --    AST 24  --    ALBUMIN 3.2*  --    PROT 6.5  --    BILITOT 0.4  --    INR  --  1.4*        Recent Labs  Lab 08/04/18  1204 08/04/18  1840 08/05/18  0050 08/05/18  0142 08/05/18  0201 08/05/18  0724   POCTGLUCOSE 80 85 73 75 97 93     No results for input(s): CPK, CPKMB, MB, TROPONINI in the last 72 hours.    Scheduled Meds:   enoxaparin  40 mg Subcutaneous Daily    lidocaine  1 patch Transdermal Q24H    linezolid 600mg/300ml  600 mg Intravenous Q12H    lorazepam  1 mg Intravenous QID     Continuous Infusions:   dextrose 5 % and 0.45 % NaCl with KCl 20 mEq 125 mL/hr at 08/04/18 2300     As Needed:  ondansetron, sodium chloride 0.9%    Active Hospital Problems    Diagnosis  POA    *Catatonia [F06.1]  Yes    Major depressive disorder, severe [F32.2]  Yes    Bacteremia [R78.81]  Yes    Coagulopathy [D68.9]  Yes      Resolved Hospital Problems    Diagnosis Date Resolved POA   No resolved problems to display.       Overview  Ms Rupal Mcintyre is a 28 y.o. woman with depression and prior catatonia who presents as transfer from [facility] for catatonia nad Neuro/Psych evaluation. Patient is lying in bed with eyes open but does not respond to questions. Called father Kennedy Mcintyre- he states this happened 3 years ago and was hospitalized at [facility] for catatonia associated with depression. Required ECT which was successful. Became unresponsive again 6-7 days ago off and on. She had decreased PO  "intake. Father states that first episode was triggered by patient's mother's death. He thinks that paternal grandmother's death (3 years ago) and recent move into her grandmother's house due to snakes infesting her trailer (3 months ago). This move made patient depressed according to her father. He also thinks that break up with her boyfriend (3-4 years ago). may have triggered it. Patient says that she has been depressed for "a while" according to her father. She follows with Dr Arenas in [place] and receives meds from [facility] in [place] (not open currently).    Patient given 2mg ativan as per psych recommendations and to watch patient, if no improvement get father consent for ECT. Patient appears to be improving with psych and nursing staff, ativan decreased to 1mg q6hrs. Patient still catatonic for hospital medicine.     Assessment and Plan for Problems addressed today:    Catatonia     - OSH workup including LP, MRI/MRA, CTH negative  - check HIV, RPR, TSH  - vEEG ordered  - not malignant- no fever, no rigidity, no autonomic instability  -Ativan at 1mg q6hrs, if no improvement consider father consent for ECT     Coagulopathy     INR 1.4  May be nutritional vitamin K deficiency given long NPO status  Vitamin K SQ x1             Bacteremia     - 1/2 cultures positive on admit to OSH for gram positive. Notes from OSH state thought to be E. Faecalis. TTE performed and negative there. Repeat blood cultures negative. ?contaminant?  - repeat blood cultures x2  - continue linezolid for now- will need to call OSH for culture results to narrow               Discharge plan and follow up    Provider    I personally scribed for Mohsen Astorga MD on 08/05/2018 at 9:52 AM. Electronically signed by christian Anderson III on 08/05/2018 at 9:52 AM    " Handoff

## 2021-03-05 NOTE — ED ADULT NURSE REASSESSMENT NOTE - NS ED NURSE REASSESS COMMENT FT1
Report given to OR RN Nury-belongings sent to security and OR check list done. Pt sent via stretcher.

## 2021-03-05 NOTE — CONSULT NOTE ADULT - ATTENDING COMMENTS
Patient is seen and examined at bedside with MYRA Wills. Patient states she sustained mechanical fall last week resultant with RT wrist fracture. She is going to OR today with DR Ayala. EKG- sinus, no acute ischemic changes. Patient is medically optimized for OR. Agree with above assessment and plan. D/w pt and ortho team

## 2021-03-05 NOTE — DISCHARGE NOTE PROVIDER - CARE PROVIDER_API CALL
Ashok Ayala)  Orthopaedic Surgery; Surgery of the Hand  [address] [place] [address]  Phone: [phone]  Fax: [phone]  Follow Up Time:

## 2021-03-05 NOTE — DISCHARGE NOTE PROVIDER - HOSPITAL COURSE
Hospital Course:     The patient is a 70y Female status post Open Reduction Internal Fixation of a Right Distal Radius Fracture. Pt sustained injury from a fall and was admitted through the ED. Prior to surgery Patient was medically Optimized. Prophylactic antibiotics were started within 30 minutes before the procedure and continued for 24 hours.  There were no complications during the procedure.  The patient was transferred to recovery room in stable condition and subsequently home. All home medications were continued.  The Splint was kept clean, dry, intact. The rest of the hospital stay was unremarkable and patient was discharged in stable condition to follow up as outpatient.

## 2021-03-05 NOTE — BRIEF OPERATIVE NOTE - NSICDXBRIEFPREOP_GEN_ALL_CORE_FT
PRE-OP DIAGNOSIS:  Closed fracture of distal end of right radius 05-Mar-2021 16:43:33  Deng Franco

## 2021-03-05 NOTE — ED ADULT NURSE NOTE - NSIMPLEMENTINTERV_GEN_ALL_ED
Implemented All Fall Risk Interventions:  [place] to call system. Call bell, personal items and telephone within reach. Instruct patient to call for assistance. Room bathroom lighting operational. Non-slip footwear when patient is off stretcher. Physically safe environment: no spills, clutter or unnecessary equipment. Stretcher in lowest position, wheels locked, appropriate side rails in place. Provide visual cue, wrist band, yellow gown, etc. Monitor gait and stability. Monitor for mental status changes and reorient to person, place, and time. Review medications for side effects contributing to fall risk. Reinforce activity limits and safety measures with patient and family.

## 2021-03-05 NOTE — ED PROVIDER NOTE - PMH
Chronic GERD    Neuropathic pain, leg, left    Spinal stenosis    TIA (transient ischemic attack)

## 2021-03-05 NOTE — ASU DISCHARGE PLAN (ADULT/PEDIATRIC) - PATIENT EDUCATION MATERIALS PROVIED
Provider pre-printed instructions given Provider pre-printed instructions given/Pre-printed instructions given for nerve block

## 2021-03-05 NOTE — H&P ADULT - ASSESSMENT
A: 70F right distal radius Fx     P;  remain NPO   options risks benefits complications reviewed with patient regarding surgical intervention. pt understands and agrees to proposed procedure  consent obtained for right wrist ORIF   X ray right wrist  medical clearance   hold chemical anticoagulation   IVF  COVID, CBC BMP PT PTT T&S EKG, CXR  plan for OR today pending clearance   Discussed with dr hillman who is aware and agrees with plan

## 2021-03-05 NOTE — ED ADULT NURSE REASSESSMENT NOTE - NS ED NURSE REASSESS COMMENT FT1
Pt alert and oriented, VSS afebrile. Pt resting comfortably-denies pain, says her right wrist is "annoying" but not hurting. Pt assisted OOB to ambulate to the bathroom with standby assist-gait steady. Pt NPO for OR, IVF infusing. Pt aware she is NPO. Covid swab pending. OR time unknown. All needs addressed and safety maintained. Call bell in reach.

## 2021-03-05 NOTE — ASU DISCHARGE PLAN (ADULT/PEDIATRIC) - CARE PROVIDER_API CALL
Ashok Ayala)  Orthopaedic Surgery; Surgery of the Hand  [address] [place] [address]  Phone: [phone]  Fax: [phone]  Follow Up Time:

## 2021-03-05 NOTE — ED PROVIDER NOTE - OBJECTIVE STATEMENT
69 y/o F R hand dominant with PMHx of TIA on baby ASA, GERD, and spinal stenosis presents to the ED for admission for orthopedic surgery with Dr. Ayala. Pt has known comminuted fracture of the R distal radius incurred s/p trip and fall on 2/25, was seen in [facility] by Dr. yAala at that time and had splint placed. No fever. Has not taken anything for pain today. Completed COVID vaccination series. Allergic to NIFEdipine.

## 2021-03-05 NOTE — ASU DISCHARGE PLAN (ADULT/PEDIATRIC) - NURSING INSTRUCTIONS
For any problems or concerns,contact your doctor. [facility] patients should call the [facility]. If you cannot reach the doctor or clinic, call [facility] Emergency Department at [phone] or go to your local Emergency Department.  A responsible adult should be with you for the rest of the day and night for your safety and to help you if you needed. Resume your medications as listed on the attached Medication Record. Begin with liquids and light food ( tea, toast, Jello, soups). Advance to what you normally eat. Liquids should taken in adequate amounts today.     CALL the DOCTOR:    -Fever greater than  101F  - Signs  of infection such as : increase pain,swelling,redness,or a bad  smell coming from the wound.  -Excessive amount of bleeding.  - Any pain that appears to be getting worse.  - Vomiting  -  If you have  not urinated 8 hours after surgery or have any difficulty urinating.     A responsible adult should be with you for the rest of the day and night for your safety and to help you if you needed.    Review attached FACT SHEET if applicable.

## 2021-03-06 LAB
SARS-COV-2 IGG SERPL QL IA: NEGATIVE — SIGNIFICANT CHANGE UP
SARS-COV-2 IGM SERPL IA-ACNC: 0.07 INDEX — SIGNIFICANT CHANGE UP

## 2021-06-01 DIAGNOSIS — Z86.73 PERSONAL HISTORY OF TRANSIENT ISCHEMIC ATTACK (TIA), AND CEREBRAL INFARCTION WITHOUT RESIDUAL DEFICITS: ICD-10-CM

## 2021-06-01 DIAGNOSIS — Z88.8 ALLERGY STATUS TO OTHER DRUGS, MEDICAMENTS AND BIOLOGICAL SUBSTANCES STATUS: ICD-10-CM
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
## 2021-06-01 DIAGNOSIS — Z79.82 LONG TERM (CURRENT) USE OF ASPIRIN: ICD-10-CM

## 2021-06-01 DIAGNOSIS — K21.9 GASTRO-ESOPHAGEAL REFLUX DISEASE WITHOUT ESOPHAGITIS: ICD-10-CM

## 2021-06-01 DIAGNOSIS — S52.561A BARTON'S FRACTURE OF RIGHT RADIUS, INITIAL ENCOUNTER FOR CLOSED FRACTURE: ICD-10-CM

## 2021-06-01 DIAGNOSIS — Z79.899 OTHER LONG TERM (CURRENT) DRUG THERAPY: ICD-10-CM

## 2021-06-01 DIAGNOSIS — S52.571A OTHER INTRAARTICULAR FRACTURE OF LOWER END OF RIGHT RADIUS, INITIAL ENCOUNTER FOR CLOSED FRACTURE: ICD-10-CM

## 2021-06-01 DIAGNOSIS — G62.9 POLYNEUROPATHY, UNSPECIFIED: ICD-10-CM
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
## 2021-06-01 DIAGNOSIS — M48.00 SPINAL STENOSIS, SITE UNSPECIFIED: ICD-10-CM

## 2021-06-01 DIAGNOSIS — E78.5 HYPERLIPIDEMIA, UNSPECIFIED: ICD-10-CM

## 2022-03-25 NOTE — DISCHARGE NOTE PROVIDER - NSDCADMDATE_GEN_ALL_CORE_FT
[facility] requires a business meeting to set up an account with [employer]. Writer will fax information for DME order to [facility]#[phone].   05-Mar-2021 09:24

## 2022-04-05 NOTE — ED PROVIDER NOTE - TEMPLATE
EMERGENCY DEPARTMENT ENCOUNTER      CHIEF COMPLAINT    Chief Complaint   Patient presents with    Diarrhea Adult    Nausea    Weakness    Chills       HPI    Lali Mayo is a 57 year old female who has a history of hypertension and takes lisinopril daily presents with body aches, nausea, dizzy, weakness and nonbloody diarrhea ongoing x4 days.  Positive cough, denies any chest pain or shortness of breath.  Cough has been nonproductive.  Cough has been ongoing for some time. No fevers.  No headache, sore throat or nasal congestion.  No abdominal pain or vomiting.  No back pain.  Patient has taken no medication for symptoms.  No known sick contacts or recent travel.  She is not vaccinated for COVID her the flu.  Patient has no other concerns or complaints.    ALLERGIES    ALLERGIES:   Allergen Reactions    Bananas [Banana] Other (See Comments)     Pt. Reports \"scratchy throat and mouth\"    Cat Dander RASH     Rash, Skin welps    Contrast Media VOMITING    Melons   (Food) Other (See Comments)     Pt. Reports \"scratchy throat and mouth\"    Nuts   (Food) Other (See Comments)     Pt. Reports \"all nuts except peanuts and cashews\", pt. reports mouth itchy and scratchy\"       CURRENT MEDICATIONS    Current Facility-Administered Medications   Medication Dose Route Frequency Provider Last Rate Last Admin    hydrALAZINE (APRESOLINE) injection 10 mg  10 mg Intravenous Once Wilbert Berger PA-C         Current Outpatient Medications   Medication Sig Dispense Refill    atorvastatin (LIPITOR) 80 MG tablet Take 1 tablet by mouth nightly. 60 tablet 1    aspirin (ECOTRIN) 81 MG EC tablet Take 1 tablet by mouth daily. 90 tablet 1    clopidogrel (Plavix) 75 MG tablet Take 1 tablet by mouth daily. 90 tablet 0    lisinopril (ZESTRIL) 40 MG tablet Take 1 tablet by mouth daily. 90 tablet 1    Cholecalciferol (Vitamin D3) 20 mcg (800 units) tablet Take 1 tablet by mouth daily. 75 tablet 3    varenicline (CHANTIX) 1 MG tablet Take 1 tablet  by mouth 2 times daily. 56 tablet 0    meclizine (ANTIVERT) 25 MG tablet Take 1 tablet by mouth 3 times daily as needed for Dizziness. 15 tablet 0    diphenhydrAMINE (Banophen) 50 MG capsule Take 1 capsule by mouth 1 hour prior to contrast administration. 1 capsule 0    predniSONE (DELTASONE) 50 MG tablet Take 1 tablet by mouth at 13 hours, 7 hours, and 1 hour prior to contrast administration. 3 tablet 0    meloxicam (MOBIC) 15 MG tablet Take 1 tablet by mouth daily as needed for Pain. 30 tablet 0    latanoprost (XALATAN) 0.005 % ophthalmic solution Place 1 drop into both eyes nightly.         PAST MEDICAL HISTORY    Past Medical History:   Diagnosis Date    Arthritis     Essential (primary) hypertension     Failed moderate sedation during procedure     Fracture     thumb    Glaucoma        SURGICAL HISTORY    Past Surgical History:   Procedure Laterality Date    Colonoscopy  02/21/2017    Incomplete, rescheduled    Colonoscopy diagnostic  05/04/2017    Affi 10yr recall, 1 polyp hyperplastic    Hysterectomy      Liver surgery  10/2016    DRAIN PLACED    Tubal ligation         SOCIAL HISTORY    Social History     Tobacco Use    Smoking status: Current Every Day Smoker     Packs/day: 1.00     Types: Cigarettes    Smokeless tobacco: Former User   Substance Use Topics    Alcohol use: Yes     Comment: 2 wine coolers per night.    Drug use: Not Currently     Types: Cocaine, Marijuana     Comment: quit years ago.       FAMILY HISTORY    Family History   Problem Relation Age of Onset    Diabetes Mother     Heart disease Mother     Cancer Mother     Heart disease Sister        REVIEW OF SYSTEMS      Constitutional:  Denies fever or chills.   HENT:  Denies nasal congestion or sore throat.   Respiratory:  See HPI  Cardiovascular:  Denies chest pain or edema.   GI:  See HPI   :  Denies dysuria.   Musculoskeletal:  Denies back pain or joint pain.   Integument:  Denies rash.   Neurologic:  Denies headache, focal weakness or  sensory changes.       PHYSICAL EXAM    ED Triage Vitals [04/05/22 0942]   BP (!) 143/97   Heart Rate 77   Resp 14   Temp 98.7 °F (37.1 °C)   SpO2 99 %     Vitals:    04/05/22 1115 04/05/22 1146 04/05/22 1202 04/05/22 1230   BP: (!) 206/89 (!) 209/86 (!) 200/107 (!) 206/95   BP Location:       Pulse: 81 66 74 72   Resp: 15 (!) 21 18 20   Temp:       TempSrc:       SpO2: 96%  97% 95%       Pulse Ox Interpretation:  Within normal limits  Constitutional:  Well developed, well nourished. No acute distress, non-toxic appearance.   HENT:  Oropharynx moist and pain without erythema or exudate.  Uvula is midline.  Neck: Normal range of motion. No tenderness. Supple.   Respiratory:  No respiratory distress, normal breath sounds. No rales. No wheezing.   Cardiovascular:  Normal rate, normal rhythm. No murmurs, gallops, or rubs.  GI:  Soft, nondistended. Normal bowel sounds, nontender. No hepatomegaly, splenomegaly, mass, rebound or guarding.   :  No costovertebral angle tenderness.   Musculoskeletal:  No edema, tenderness, or deformities. Back - no tenderness.   Integument:  Well hydrated, no rash.   Neurologic:  Alert and oriented x 3. Cranial nerves 2-12 normal. Normal motor function. Normal sensory function. No focal deficits noted.    EKG    Normal sinus rhythm   Heart rate 71   No significant change from previous   Read by Dr. Esparza    RADIOLOGY    XR CHEST AP OR PA - PORTABLE   Final Result   IMPRESSION:      No acute processes.      CT HEAD WO CONTRAST    (Results Pending)         LABS    Labs Reviewed   COMPREHENSIVE METABOLIC PANEL - Abnormal; Notable for the following components:       Result Value    Alkaline Phosphatase 151 (*)     Protein, Total 8.4 (*)     Globulin 4.2 (*)     All other components within normal limits   LIPASE - Normal   TROPONIN I, HIGH SENSITIVITY - Normal   CBC WITH DIFFERENTIAL    Narrative:     The following orders were created for panel order CBC with Automated Differential.  Procedure                                Abnormality         Status                     ---------                               -----------         ------                     CBC with Automated Dif...[[id number]]                      Final result                 Please view results for these tests on the individual orders.   SARS-COV-2/INFLUENZA BY PCR   CBC WITH AUTOMATED DIFFERENTIAL (PERFORMABLE ONLY)    Narrative:     This is an appended report.  These results have been appended to a previously verified report.   EXTRA TUBES    Narrative:     The following orders were created for panel order Extra Tubes.  Procedure                               Abnormality         Status                     ---------                               -----------         ------                     Light Blue Top[[id number]]                                 In process                 Gold Top[[id number]]                                       In process                   Please view results for these tests on the individual orders.   LIGHT BLUE TOP   GOLD TOP     ED COURSE & MEDICAL DECISION MAKING    A re-examination patient remains hypertensive.  Patient denies any headache, chest pain, shortness of breath.  She does dizziness with a cough and diarrhea.  She does state the cough has been ongoing for some time.  Patient was staffed and seen by Dr. Hirsch.    CT head will be obtained.  Patient given hydralazine for hypertension.  Regarding patient's cough, she has had a cough for quite some time which may be from her lisinopril.    Blood pressure improved on re-examination.  Patient's blood pressure is 183/82.    1538  CT shows no evidence intracranial hemorrhage but there is a small to moderate size area of hypoattenuation right occipital lobe, the this has a chronic appearance but is new from the CT MRI that was done in September of last year.  MRI the has recommended and will be obtained.  Patient is agreeable to plan. Patient also has had  dizziness on chart review noted by her primary doctor and has been seen several times.  She has been on meclizine before for her dizziness. Patient will be given meclizine.      Sign out to AARON Schmidt PA-C, waiting MRI brain    FINAL IMPRESSION    Dizziness   Diarrhea        Wilbert BARBA GABRIELLE Berger  04/05/22 2918 1785- Care endorsed to me at shift change. Patient's history, physical exam, and ED course was reviewed. Plan for meclizine administration and MRI brain. Blood pressure continues to be significantly elevated.  Patient states that she did not take her at home lisinopril today.  Discussed with Dr. Hirsch.  Will administer 20 mg of IV labetalol.    1830- MRI of the brain is negative for acute findings.  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  Findings discussed with Dr. Hirsch.  On recheck, patient is resting comfortably.  She says that she is feeling much better following meclizine.  She is not experiencing any dizziness or nausea at this time.  She has not had any episodes of diarrhea while in the ED. Blood pressure is currently 195/91. Blood pressures have not been as elevated during previous visits. She has not slept in over 24 hours due to working the night shift, which could be contributing to the patient's elevated blood pressure today. Will administer a dose of clonidine.  Patient is appropriate for discharge home following.  Patient was instructed to follow-up with her primary care provider as soon as possible for symptom recheck, as well as blood pressure recheck.  Discussed the patient may need to be on additional blood pressure medication. I discussed the possible diagnoses with the patient as well as the warning signs and symptoms.  The patient understands that this is a provisional diagnosis which can and do change.  The diagnosis that the patient was discharged with today is based on the symptoms with which they presented.  If any new symptoms occur or if  symptoms worsen, the patient understands that they must seek immediate attention for re-evaluation.  Patient was also provided with discharge instructions and follow up information.  Patient is to follow up with PCP in 1 day with regard to all of the above medical problems.  Patient expressed their understanding and agrees with plan for discharge.  All questions have been answered.      GABRIELLE Hua PA-C  04/05/22 9222        I supervised the PA and agree with their note.  I independently obtained a history, performed an exam, reviewed pertinent results and discussed the case with the PA.  I agree with assessment and plan.  Patient is a 57-year-old female presenting to the ER with complaints of having dizziness and nausea for the last 3 to 4 days.  Denies any significant headache.  Also complains of having chronic cough and diarrhea.  Patient is on lisinopril for her blood pressures for the last many years.  On exam patient does not have any neurological deficits.  Workup in the ER was essentially within normal limits except for CT scan showing hypoattenuation in the cerebellar region.  MRI of the brain did not show any acute intracranial process but shows old infarcts.  Patient's blood pressure has been elevated consistently while in the ER.  Patient received IV hydralazine, IV labetalol and clonidine.  Patient records reviewed.  Patient's previous blood pressures have been essentially within normal ranges.  Blood pressure elevation possibly secondary to patient's dizziness and also as patient works 3rd shift and has not slept today as she was in the ER for dizziness.  Patient's cough also may be secondary to patient being on lisinopril.  Discussed with patient regarding these PACs.  Advised to follow up with primary care in 2 to 3 days time for re-evaluation and recheck of her blood pressure.  Advised return to ER in case of any worsening dizziness, vomiting, any altered mentation,  altered balance or any other new symptoms.  Patient verbalized understanding plan.  Patient discharged home in stable condition.     Rene BYERS MD  04/05/22 9027     Orthopedic

## 2023-11-14 NOTE — ED ADULT NURSE NOTE - PRIMARY CARE PROVIDER
Update History & Physical    The patient's History and Physical of October 27, 2023 was reviewed with the patient and I examined the patient. There was no change. The surgical site was confirmed by the patient and me. Plan: The risks, benefits, expected outcome, and alternative to the recommended procedure have been discussed with the patient. Patient understands and wants to proceed with the procedure.      Electronically signed by Dane Guerrero DO on 11/14/2023 at 10:01 AM Dr ardon

## 2024-12-31 ENCOUNTER — NON-APPOINTMENT (OUTPATIENT)
Age: 74
End: 2024-12-31

## 2025-04-16 ENCOUNTER — NON-APPOINTMENT (OUTPATIENT)
Age: 75
End: 2025-04-16

## 2025-07-12 NOTE — ED ADULT NURSE NOTE - OBJECTIVE STATEMENT
Pt has known fx with needed repair in OR. Pt reports last po intake approx 830pm-soda and pretzels.  Splint/cast in place with fingers warm and mobile.  Pt report to RN and care transferred at this time. normal (ped)...

## 2025-07-18 NOTE — DISCHARGE NOTE ADULT - NS AS DC DISCHARGE ACCOMPANY
Addendum  created 07/18/25 1050 by Steven Paredes MD    Charge Capture section accepted       Family